# Patient Record
Sex: MALE | Race: WHITE | Employment: UNEMPLOYED | URBAN - METROPOLITAN AREA
[De-identification: names, ages, dates, MRNs, and addresses within clinical notes are randomized per-mention and may not be internally consistent; named-entity substitution may affect disease eponyms.]

---

## 2017-02-24 ENCOUNTER — OFFICE VISIT (OUTPATIENT)
Dept: FAMILY MEDICINE CLINIC | Age: 37
End: 2017-02-24

## 2017-02-24 VITALS
WEIGHT: 183 LBS | HEIGHT: 67 IN | RESPIRATION RATE: 18 BRPM | OXYGEN SATURATION: 98 % | TEMPERATURE: 98.6 F | SYSTOLIC BLOOD PRESSURE: 100 MMHG | DIASTOLIC BLOOD PRESSURE: 71 MMHG | HEART RATE: 68 BPM | BODY MASS INDEX: 28.72 KG/M2

## 2017-02-24 DIAGNOSIS — M54.5 ACUTE BILATERAL LOW BACK PAIN, WITH SCIATICA PRESENCE UNSPECIFIED: ICD-10-CM

## 2017-02-24 DIAGNOSIS — J01.00 ACUTE MAXILLARY SINUSITIS, RECURRENCE NOT SPECIFIED: Primary | ICD-10-CM

## 2017-02-24 DIAGNOSIS — J30.1 SEASONAL ALLERGIC RHINITIS DUE TO POLLEN: ICD-10-CM

## 2017-02-24 RX ORDER — FLUTICASONE PROPIONATE 50 MCG
2 SPRAY, SUSPENSION (ML) NASAL DAILY
Qty: 3 BOTTLE | Refills: 3 | Status: SHIPPED | OUTPATIENT
Start: 2017-02-24 | End: 2022-03-24 | Stop reason: SDUPTHER

## 2017-02-24 RX ORDER — CYCLOBENZAPRINE HCL 10 MG
10 TABLET ORAL
Qty: 45 TAB | Refills: 1 | Status: SHIPPED | OUTPATIENT
Start: 2017-02-24 | End: 2018-01-29 | Stop reason: SDUPTHER

## 2017-02-24 RX ORDER — DOXYCYCLINE 100 MG/1
100 CAPSULE ORAL 2 TIMES DAILY
Qty: 20 CAP | Refills: 0 | Status: SHIPPED | OUTPATIENT
Start: 2017-02-24 | End: 2021-10-21

## 2017-02-24 NOTE — MR AVS SNAPSHOT
Visit Information Date & Time Provider Department Dept. Phone Encounter #  
 2/24/2017  4:00 PM Bernabe Braswell, [address] [phone] [id number] Follow-up Instructions Return if symptoms worsen or fail to improve. Upcoming Health Maintenance Date Due DTaP/Tdap/Td series (1 - Tdap) 4/29/2000 INFLUENZA AGE 9 TO ADULT 8/1/2016 Allergies as of 2/24/2017  Review Complete On: 2/24/2017 By: Bernabe Braswell NP No Known Allergies Current Immunizations  Reviewed on 5/1/2013 Name Date  
 TB Skin Test (PPD) Intradermal 5/1/2013 Not reviewed this visit You Were Diagnosed With   
  
 Codes Comments Acute maxillary sinusitis, recurrence not specified    -  Primary ICD-10-CM: J01.00 ICD-9-CM: 461.0 Seasonal allergic rhinitis due to pollen     ICD-10-CM: J30.1 ICD-9-CM: 477.0 Acute bilateral low back pain, with sciatica presence unspecified     ICD-10-CM: M54.5 ICD-9-CM: 724.2 Vitals BP  
  
  
  
  
  
 100/71 BMI and BSA Data Body Mass Index Body Surface Area  
 28.66 kg/m 2 1.98 m 2 Preferred Pharmacy Pharmacy Name Phone [facility] [address] [place] [address] [phone] Your Updated Medication List  
  
   
This list is accurate as of: 2/24/17  4:52 PM.  Always use your most recent med list.  
  
  
  
  
 cyclobenzaprine 10 mg tablet Commonly known as:  FLEXERIL Take 1 Tab by mouth three (3) times daily as needed for Muscle Spasm(s). doxycycline 100 mg capsule Commonly known as:  Daved Pollen Take 1 Cap by mouth two (2) times a day. fluticasone 50 mcg/actuation nasal spray Commonly known as:  Cole Stockton 2 Sprays by Both Nostrils route daily for 90 days. Long term, three month supply  Indications: ALLERGIC RHINITIS Prescriptions Sent to Pharmacy  Refills  
 doxycycline (MONODOX) 100 mg capsule 0  
 Sig: Take 1 Cap by mouth two (2) times a day. Class: Normal  
 Pharmacy: [facility] [id number] [place] [address] Ph #: [phone] Route: Oral  
 fluticasone (FLONASE) 50 mcg/actuation nasal spray 3 Si Sprays by Both Nostrils route daily for 90 days. Long term, three month supply  Indications: ALLERGIC RHINITIS Class: Normal  
 Pharmacy: [facility] [id number] [place] [address] Ph #: [phone] Route: Both Nostrils  
 cyclobenzaprine (FLEXERIL) 10 mg tablet 1 Sig: Take 1 Tab by mouth three (3) times daily as needed for Muscle Spasm(s). Class: Normal  
 Pharmacy: [facility] [id number] [place] [address] Ph #: [phone] Route: Oral  
  
Follow-up Instructions Return if symptoms worsen or fail to improve. Patient Instructions Back Pain: Care Instructions Your Care Instructions Back pain has many possible causes. It is often related to problems with muscles and ligaments of the back. It may also be related to problems with the nerves, discs, or bones of the back. Moving, lifting, standing, sitting, or sleeping in an awkward way can strain the back. Sometimes you don't notice the injury until later. Arthritis is another common cause of back pain. Although it may hurt a lot, back pain usually improves on its own within several weeks. Most people recover in 12 weeks or less. Using good home treatment and being careful not to stress your back can help you feel better sooner. Follow-up care is a key part of your treatment and safety. Be sure to make and go to all appointments, and call your doctor if you are having problems. Its also a good idea to know your test results and keep a list of the medicines you take. How can you care for yourself at home? · Sit or lie in positions that are most comfortable and reduce your pain. Try one of these positions when you lie down: ¨ Lie on your back with your knees bent and supported by large pillows. ¨ Lie on the floor with your legs on the seat of a sofa or chair. Rhonda Able on your side with your knees and hips bent and a pillow between your legs. ¨ Lie on your stomach if it does not make pain worse. · Do not sit up in bed, and avoid soft couches and twisted positions. Bed rest can help relieve pain at first, but it delays healing. Avoid bed rest after the first day of back pain. · Change positions every 30 minutes. If you must sit for long periods of time, take breaks from sitting. Get up and walk around, or lie in a comfortable position. · Try using a heating pad on a low or medium setting for 15 to 20 minutes every 2 or 3 hours. Try a warm shower in place of one session with the heating pad. · You can also try an ice pack for 10 to 15 minutes every 2 to 3 hours. Put a thin cloth between the ice pack and your skin. · Take pain medicines exactly as directed. ¨ If the doctor gave you a prescription medicine for pain, take it as prescribed. ¨ If you are not taking a prescription pain medicine, ask your doctor if you can take an over-the-counter medicine. · Take short walks several times a day. You can start with 5 to 10 minutes, 3 or 4 times a day, and work up to longer walks. Walk on level surfaces and avoid hills and stairs until your back is better. · Return to work and other activities as soon as you can. Continued rest without activity is usually not good for your back. · To prevent future back pain, do exercises to stretch and strengthen your back and stomach. Learn how to use good posture, safe lifting techniques, and proper body mechanics. When should you call for help? Call your doctor now or seek immediate medical care if: 
· You have new or worsening numbness in your legs. · You have new or worsening weakness in your legs. (This could make it hard to stand up.) · You lose control of your bladder or bowels. Watch closely for changes in your health, and be sure to contact your doctor if: 
· Your pain gets worse. · You are not getting better after 2 weeks. Where can you learn more? Go to http://alicia-cam.info/. Enter R196 in the search box to learn more about \"Back Pain: Care Instructions. \" Current as of: May 23, 2016 Content Version: 11.1 © 6484-5281 Bloom Studio. Care instructions adapted under license by TheFamily (which disclaims liability or warranty for this information). If you have questions about a medical condition or this instruction, always ask your healthcare professional. [place] [address] any warranty or liability for your use of this information. Back Stretches: Exercises Your Care Instructions Here are some examples of exercises for stretching your back. Start each exercise slowly. Ease off the exercise if you start to have pain. Your doctor or physical therapist will tell you when you can start these exercises and which ones will work best for you. How to do the exercises Overhead stretch 1. Stand comfortably with your feet shoulder-width apart. 2. Looking straight ahead, raise both arms over your head and reach toward the ceiling. Do not allow your head to tilt back. 3. Hold for 15 to 30 seconds, then lower your arms to your sides. 4. Repeat 2 to 4 times. Side stretch 1. Stand comfortably with your feet shoulder-width apart. 2. Raise one arm over your head, and then lean to the other side. 3. Slide your hand down your leg as you let the weight of your arm gently stretch your side muscles. Hold for 15 to 30 seconds. 4. Repeat 2 to 4 times on each side. Press-up 1. Lie on your stomach, supporting your body with your forearms. 2. Press your elbows down into the floor to raise your upper back.  As you do this, relax your stomach muscles and allow your back to arch without using your back muscles. As your press up, do not let your hips or pelvis come off the floor. 3. Hold for 15 to 30 seconds, then relax. 4. Repeat 2 to 4 times. Relax and rest 
 
1. Lie on your back with a rolled towel under your neck and a pillow under your knees. Extend your arms comfortably to your sides. 2. Relax and breathe normally. 3. Remain in this position for about 10 minutes. 4. If you can, do this 2 or 3 times each day. Follow-up care is a key part of your treatment and safety. Be sure to make and go to all appointments, and call your doctor if you are having problems. It's also a good idea to know your test results and keep a list of the medicines you take. Where can you learn more? Go to http://alicia-cam.info/. Enter T372 in the search box to learn more about \"Back Stretches: Exercises. \" Current as of: May 23, 2016 Content Version: 11.1 © 1385-0439 Luvocracy. Care instructions adapted under license by TeeBeeDee (which disclaims liability or warranty for this information). If you have questions about a medical condition or this instruction, always ask your healthcare professional. [place] [address] any warranty or liability for your use of this information. Introducing [facility]! Shey Anderson introduces Zecco patient portal. Now you can access parts of your medical record, email your doctor's office, and request medication refills online. 1. In your internet browser, go to [email or web address] 2. Click on the First Time User? Click Here link in the Sign In box. You will see the New Member Sign Up page. 3. Enter your Zecco Access Code exactly as it appears below. You will not need to use this code after youve completed the sign-up process. If you do not sign up before the expiration date, you must request a new code. · Zecco Access Code: [email or web address] Expires: 5/25/2017  4:52 PM 
 
4. Enter the last four digits of your Social Security Number (xxxx) and Date of Birth (mm/dd/yyyy) as indicated and click Submit. You will be taken to the next sign-up page. 5. Create a Outitude ID. This will be your Outitude login ID and cannot be changed, so think of one that is secure and easy to remember. 6. Create a Outitude password. You can change your password at any time. 7. Enter your Password Reset Question and Answer. This can be used at a later time if you forget your password. 8. Enter your e-mail address. You will receive e-mail notification when new information is available in [address] 9. Click Sign Up. You can now view and download portions of your medical record. 10. Click the Download Summary menu link to download a portable copy of your medical information. If you have questions, please visit the Frequently Asked Questions section of the Outitude website. Remember, Outitude is NOT to be used for urgent needs. For medical emergencies, dial 911. Now available from your iPhone and Android! Please provide this summary of care documentation to your next provider. Your primary care clinician is listed as HONEY RIBERA. If you have any questions after today's visit, please call [phone].

## 2017-02-24 NOTE — LETTER
NOTIFICATION RETURN TO WORK / SCHOOL 
 
2/24/2017 4:53 PM 
 
Mr. Rody High [address] [address] To Whom It May Concern: Rody High is currently under the care of [address] [place] [address]. He will return to work/school on: 2-27-17 If there are questions or concerns please have the patient contact our office.  
 
 
 
Sincerely, 
 
 
Igor Carlos, NP

## 2017-02-24 NOTE — PATIENT INSTRUCTIONS
Back Pain: Care Instructions  Your Care Instructions    Back pain has many possible causes. It is often related to problems with muscles and ligaments of the back. It may also be related to problems with the nerves, discs, or bones of the back. Moving, lifting, standing, sitting, or sleeping in an awkward way can strain the back. Sometimes you don't notice the injury until later. Arthritis is another common cause of back pain. Although it may hurt a lot, back pain usually improves on its own within several weeks. Most people recover in 12 weeks or less. Using good home treatment and being careful not to stress your back can help you feel better sooner. Follow-up care is a key part of your treatment and safety. Be sure to make and go to all appointments, and call your doctor if you are having problems. Its also a good idea to know your test results and keep a list of the medicines you take. How can you care for yourself at home? · Sit or lie in positions that are most comfortable and reduce your pain. Try one of these positions when you lie down:  ¨ Lie on your back with your knees bent and supported by large pillows. ¨ Lie on the floor with your legs on the seat of a sofa or chair. Judie Gal on your side with your knees and hips bent and a pillow between your legs. ¨ Lie on your stomach if it does not make pain worse. · Do not sit up in bed, and avoid soft couches and twisted positions. Bed rest can help relieve pain at first, but it delays healing. Avoid bed rest after the first day of back pain. · Change positions every 30 minutes. If you must sit for long periods of time, take breaks from sitting. Get up and walk around, or lie in a comfortable position. · Try using a heating pad on a low or medium setting for 15 to 20 minutes every 2 or 3 hours. Try a warm shower in place of one session with the heating pad. · You can also try an ice pack for 10 to 15 minutes every 2 to 3 hours.  Put a thin cloth between the ice pack and your skin. · Take pain medicines exactly as directed. ¨ If the doctor gave you a prescription medicine for pain, take it as prescribed. ¨ If you are not taking a prescription pain medicine, ask your doctor if you can take an over-the-counter medicine. · Take short walks several times a day. You can start with 5 to 10 minutes, 3 or 4 times a day, and work up to longer walks. Walk on level surfaces and avoid hills and stairs until your back is better. · Return to work and other activities as soon as you can. Continued rest without activity is usually not good for your back. · To prevent future back pain, do exercises to stretch and strengthen your back and stomach. Learn how to use good posture, safe lifting techniques, and proper body mechanics. When should you call for help? Call your doctor now or seek immediate medical care if:  · You have new or worsening numbness in your legs. · You have new or worsening weakness in your legs. (This could make it hard to stand up.)  · You lose control of your bladder or bowels. Watch closely for changes in your health, and be sure to contact your doctor if:  · Your pain gets worse. · You are not getting better after 2 weeks. Where can you learn more? Go to http://alicia-cam.info/. Enter A010 in the search box to learn more about \"Back Pain: Care Instructions. \"  Current as of: May 23, 2016  Content Version: 11.1  © 2601-4738 Charity Engine. Care instructions adapted under license by Vello Systems (which disclaims liability or warranty for this information). If you have questions about a medical condition or this instruction, always ask your healthcare professional. [address] any warranty or liability for your use of this information. Back Stretches: Exercises  Your Care Instructions  Here are some examples of exercises for stretching your back. Start each exercise slowly. Ease off the exercise if you start to have pain. Your doctor or physical therapist will tell you when you can start these exercises and which ones will work best for you. How to do the exercises  Overhead stretch    1. Stand comfortably with your feet shoulder-width apart. 2. Looking straight ahead, raise both arms over your head and reach toward the ceiling. Do not allow your head to tilt back. 3. Hold for 15 to 30 seconds, then lower your arms to your sides. 4. Repeat 2 to 4 times. Side stretch    1. Stand comfortably with your feet shoulder-width apart. 2. Raise one arm over your head, and then lean to the other side. 3. Slide your hand down your leg as you let the weight of your arm gently stretch your side muscles. Hold for 15 to 30 seconds. 4. Repeat 2 to 4 times on each side. Press-up    1. Lie on your stomach, supporting your body with your forearms. 2. Press your elbows down into the floor to raise your upper back. As you do this, relax your stomach muscles and allow your back to arch without using your back muscles. As your press up, do not let your hips or pelvis come off the floor. 3. Hold for 15 to 30 seconds, then relax. 4. Repeat 2 to 4 times. Relax and rest    1. Lie on your back with a rolled towel under your neck and a pillow under your knees. Extend your arms comfortably to your sides. 2. Relax and breathe normally. 3. Remain in this position for about 10 minutes. 4. If you can, do this 2 or 3 times each day. Follow-up care is a key part of your treatment and safety. Be sure to make and go to all appointments, and call your doctor if you are having problems. It's also a good idea to know your test results and keep a list of the medicines you take. Where can you learn more? Go to http://alicia-cam.info/. Enter N929 in the search box to learn more about \"Back Stretches: Exercises. \"  Current as of: May 23, 2016  Content Version: 11.1  © 4102-0786 Healthwise, Incorporated. Care instructions adapted under license by Spreedly (which disclaims liability or warranty for this information). If you have questions about a medical condition or this instruction, always ask your healthcare professional. [address] any warranty or liability for your use of this information.

## 2017-02-24 NOTE — PROGRESS NOTES
Chief Complaint   Patient presents with    Nasal Congestion     for 1 week, has tried otc cold and flu meds

## 2017-02-24 NOTE — PROGRESS NOTES
Subjective:   Herman Frankel is a 39 y.o. male who complains of congestion, sneezing, sore throat, swollen glands, nasal blockage, post nasal drip, dry cough, myalgias and bilateral sinus pain for 7 days, gradually improving since that time. He denies a history of shortness of breath and wheezing. He has also had low back pain off and on that comes and goes. No radiation down the legs. .    Evaluation to date: none. Treatment to date: OTC products. Patient does not smoke cigarettes. Relevant PMH: No pertinent additional PMH. Patient Active Problem List   Diagnosis Code    ADHD (attention deficit hyperactivity disorder) F90.9    Depression F32.9     Patient Active Problem List    Diagnosis Date Noted    ADHD (attention deficit hyperactivity disorder) 05/01/2013    Depression 05/01/2013       No Known Allergies  Past Medical History:   Diagnosis Date    ADHD (attention deficit hyperactivity disorder) 5/1/2013    Depression 5/1/2013     History reviewed. No pertinent surgical history. History reviewed. No pertinent family history. Social History   Substance Use Topics    Smoking status: Passive Smoke Exposure - Never Smoker    Smokeless tobacco: Never Used    Alcohol use Yes        Review of Systems  Pertinent items are noted in HPI. Objective:     Visit Vitals    /71    Pulse 68    Temp 98.6 °F (37 °C) (Oral)    Resp 18    Ht 5' 7\" (1.702 m)    Wt 183 lb (83 kg)    SpO2 98%    BMI 28.66 kg/m2     General:  alert, cooperative, no distress   Eyes: negative   Ears: abnormal TM AD - erythematous, bulging, abnormal TM AS - erythematous, bulging   Sinuses: tenderness over bilateral maxillary, frontal and ethmoid   Mouth:  Lips, mucosa, and tongue normal. Teeth and gums normal and abnormal findings: moderate oropharyngeal erythema   Neck: supple, symmetrical, trachea midline,,mild anterior cervical adenopathy. Heart: S1 and S2 normal, no murmurs noted.     Lungs: clear to auscultation bilaterally   Abdomen: soft, non-tender. Bowel sounds normal. No masses,  no organomegaly        Assessment/Plan:   Sinusitis  Allergic rhinitis  Back pain    Discussed the dx and tx of sinusitis. Suggested symptomatic OTC remedies. Nasal saline sprays for congestion. Antibiotics per orders. RTC prn. [place]

## 2018-01-29 ENCOUNTER — OFFICE VISIT (OUTPATIENT)
Dept: FAMILY MEDICINE CLINIC | Age: 38
End: 2018-01-29

## 2018-01-29 VITALS
TEMPERATURE: 96.1 F | OXYGEN SATURATION: 99 % | RESPIRATION RATE: 16 BRPM | HEIGHT: 67 IN | BODY MASS INDEX: 29.29 KG/M2 | DIASTOLIC BLOOD PRESSURE: 73 MMHG | WEIGHT: 186.6 LBS | SYSTOLIC BLOOD PRESSURE: 116 MMHG | HEART RATE: 71 BPM

## 2018-01-29 DIAGNOSIS — S39.012A STRAIN OF LUMBAR REGION, INITIAL ENCOUNTER: ICD-10-CM

## 2018-01-29 RX ORDER — CYCLOBENZAPRINE HCL 10 MG
10 TABLET ORAL
Qty: 45 TAB | Refills: 1 | Status: SHIPPED | OUTPATIENT
Start: 2018-01-29 | End: 2021-10-21

## 2018-01-29 NOTE — PROGRESS NOTES
HISTORY OF PRESENT ILLNESS  Noreen De La Cruz is a 44 y.o. male. HPI   This gentleman has had intermittent low back pain. He is here for refills on his flexeril which he states has been helpful to him in the past.  He apparently aggravated his back playing with his son over the weekend. Pain is low back, no radiation  no problems with urination or bowel dysfunction    Review of Systems   Constitutional: Negative for fever. Gastrointestinal: Negative for abdominal pain. Genitourinary: Negative for dysuria, frequency and urgency. Musculoskeletal: Positive for back pain. Negative for joint pain and myalgias. Physical Exam   Constitutional: He is oriented to person, place, and time. He appears well-developed and well-nourished. No distress. Musculoskeletal: Normal range of motion. He exhibits tenderness. Mild paraspinal muscle ttp lumbo scaral region,slt negative   Neurological: He is alert and oriented to person, place, and time. He has normal reflexes. He displays normal reflexes. No cranial nerve deficit. He exhibits normal muscle tone. Coordination normal.   Skin: He is not diaphoretic.        ASSESSMENT and PLAN    ICD-10-CM ICD-9-CM    1. Strain of lumbar region, initial encounter S39.012A 847.2 cyclobenzaprine (FLEXERIL) 10 mg tablet   motrin prn  Flexeril refilled  Precautions given  Follow up if not better

## 2018-01-29 NOTE — PROGRESS NOTES
Chief Complaint   Patient presents with    Back Pain     Woke up during the night with pain in upper mid-back. Now back again. Out of Flexeril.  History of back strains

## 2020-12-19 ENCOUNTER — NURSE TRIAGE (OUTPATIENT)
Dept: OTHER | Facility: CLINIC | Age: 40
End: 2020-12-19

## 2020-12-19 NOTE — TELEPHONE ENCOUNTER
Reason for Disposition   [1] SEVERE back pain (e.g., excruciating, unable to do any normal activities) AND [2] not improved 2 hours after pain medicine    Answer Assessment - Initial Assessment Questions  1. MECHANISM: \"How did the injury happen? \" (Consider the possibility of domestic violence or elder abuse)      Bent over and pulled his back out    2. ONSET: \"When did the injury happen? \" (Minutes or hours ago)  Occurred today    3. LOCATION: \"What part of the back is injured? \"      Lower back    4. SEVERITY: \"Can you move the back normally?\"        5. PAIN: \"Is there any pain? \" If so, ask: \"How bad is the pain? \"   (Scale 1-10; or mild, moderate, severe)      Pain level laying down is 5/10, moving pain will go to 7-8/10    6. CORD SYMPTOMS: Any weakness or numbness of the arms or legs? \"      No    7. SIZE: For cuts, bruises, or swelling, ask: \"How large is it? \" (e.g., inches or centimeters)        8. TETANUS: For any breaks in the skin, ask: \"When was the last tetanus booster?\"       9. OTHER SYMPTOMS: \"Do you have any other symptoms? \" (e.g., abdominal pain, blood in urine)      No other symptoms    10. PREGNANCY: \"Is there any chance you are pregnant? \" \"When was your last menstrual period? \"       Na    Protocols used: BACK PAIN-ADULT-, BACK INJURY-ADULT-    Patient called pre-service center [facility]) to schedule appointment, with red flag complaint, transferred to RN access for triage. Pt calling wife. Pt in room. Wife states pt bent over today and pulled his back out. Unable to get up off the floor. Pain when movign is a 7-8/10. No other symptoms. Has taken Naproxen. Recommend pt is seen, can go to [facility], care advise given. Provided covered [facility] for MMO plan.

## 2021-07-07 ENCOUNTER — OFFICE VISIT (OUTPATIENT)
Dept: FAMILY MEDICINE CLINIC | Age: 41
End: 2021-07-07
Payer: COMMERCIAL

## 2021-07-07 VITALS
OXYGEN SATURATION: 96 % | TEMPERATURE: 98.4 F | HEART RATE: 59 BPM | BODY MASS INDEX: 29.51 KG/M2 | HEIGHT: 67 IN | RESPIRATION RATE: 18 BRPM | SYSTOLIC BLOOD PRESSURE: 117 MMHG | WEIGHT: 188 LBS | DIASTOLIC BLOOD PRESSURE: 74 MMHG

## 2021-07-07 DIAGNOSIS — F41.9 ANXIETY: Primary | ICD-10-CM

## 2021-07-07 PROCEDURE — 99203 OFFICE O/P NEW LOW 30 MIN: CPT | Performed by: FAMILY MEDICINE

## 2021-07-07 RX ORDER — SERTRALINE HYDROCHLORIDE 25 MG/1
25 TABLET, FILM COATED ORAL DAILY
Qty: 30 TABLET | Refills: 2 | Status: SHIPPED | OUTPATIENT
Start: 2021-07-07 | End: 2021-08-13 | Stop reason: SDUPTHER

## 2021-07-07 NOTE — PATIENT INSTRUCTIONS
A Healthy Lifestyle: Care Instructions  Your Care Instructions     A healthy lifestyle can help you feel good, stay at a healthy weight, and have plenty of energy for both work and play. A healthy lifestyle is something you can share with your whole family. A healthy lifestyle also can lower your risk for serious health problems, such as high blood pressure, heart disease, and diabetes. You can follow a few steps listed below to improve your health and the health of your family. Follow-up care is a key part of your treatment and safety. Be sure to make and go to all appointments, and call your doctor if you are having problems. It's also a good idea to know your test results and keep a list of the medicines you take. How can you care for yourself at home? · Do not eat too much sugar, fat, or fast foods. You can still have dessert and treats now and then. The goal is moderation. · Start small to improve your eating habits. Pay attention to portion sizes, drink less juice and soda pop, and eat more fruits and vegetables. ? Eat a healthy amount of food. A 3-ounce serving of meat, for example, is about the size of a deck of cards. Fill the rest of your plate with vegetables and whole grains. ? Limit the amount of soda and sports drinks you have every day. Drink more water when you are thirsty. ? Eat plenty of fruits and vegetables every day. Have an apple or some carrot sticks as an afternoon snack instead of a candy bar. Try to have fruits and/or vegetables at every meal.  · Make exercise part of your daily routine. You may want to start with simple activities, such as walking, bicycling, or slow swimming. Try to be active 30 to 60 minutes every day. You do not need to do all 30 to 60 minutes all at once. For example, you can exercise 3 times a day for 10 or 20 minutes.  Moderate exercise is safe for most people, but it is always a good idea to talk to your doctor before starting an exercise program.  · Keep moving. [place] the lawn, work in the garden, or [employer]. Take the stairs instead of the elevator at work. · If you smoke, quit. People who smoke have an increased risk for heart attack, stroke, cancer, and other lung illnesses. Quitting is hard, but there are ways to boost your chance of quitting tobacco for good. ? Use nicotine gum, patches, or lozenges. ? Ask your doctor about stop-smoking programs and medicines. ? Keep trying. In addition to reducing your risk of diseases in the future, you will notice some benefits soon after you stop using tobacco. If you have shortness of breath or asthma symptoms, they will likely get better within a few weeks after you quit. · Limit how much alcohol you drink. Moderate amounts of alcohol (up to 2 drinks a day for men, 1 drink a day for women) are okay. But drinking too much can lead to liver problems, high blood pressure, and other health problems. Family health  If you have a family, there are many things you can do together to improve your health. · Eat meals together as a family as often as possible. · Eat healthy foods. This includes fruits, vegetables, lean meats and dairy, and whole grains. · Include your family in your fitness plan. Most people think of activities such as jogging or tennis as the way to fitness, but there are many ways you and your family can be more active. Anything that makes you breathe hard and gets your heart pumping is exercise. Here are some tips:  ? Walk to do errands or to take your child to school or the bus.  ? Go for a family bike ride after dinner instead of watching TV. Where can you learn more? Go to http://www.gray.com/  Enter V710 in the search box to learn more about \"A Healthy Lifestyle: Care Instructions. \"  Current as of: September 23, 2020               Content Version: 12.8  © 4807-3539 Healthwise, [employer].    Care instructions adapted under license by [employer] (which disclaims liability or warranty for this information). If you have questions about a medical condition or this instruction, always ask your healthcare professional. [address] any warranty or liability for your use of this information.

## 2021-07-07 NOTE — PROGRESS NOTES
Chief Complaint   Patient presents with    New Patient   [address]     Patient presents in office today as a NP to re-establish care. Would like to see about going back on medication for his ADHD and depression. No other concerns.     3 most recent PHQ Screens 7/7/2021   Little interest or pleasure in doing things Several days   Feeling down, depressed, irritable, or hopeless More than half the days   Total Score PHQ 2 3   Trouble falling or staying asleep, or sleeping too much Not at all   Feeling tired or having little energy Nearly every day   Poor appetite, weight loss, or overeating Not at all   Feeling bad about yourself - or that you are a failure or have let yourself or your family down Several days   Trouble concentrating on things such as school, work, reading, or watching TV Several days   Moving or speaking so slowly that other people could have noticed; or the opposite being so fidgety that others notice Not at all   Thoughts of being better off dead, or hurting yourself in some way Not at all   PHQ 9 Score 8   How difficult have these problems made it for you to do your work, take care of your home and get along with others Somewhat difficult         Learning Assessment 12/12/2013   PRIMARY LEARNER Patient   HIGHEST LEVEL OF EDUCATION - PRIMARY LEARNER  SOME COLLEGE   BARRIERS PRIMARY LEARNER OTHER   CO-LEARNER CAREGIVER No   PRIMARY LANGUAGE ENGLISH   LEARNER PREFERENCE PRIMARY DEMONSTRATION   ANSWERED BY patient   RELATIONSHIP SELF

## 2021-07-07 NOTE — PROGRESS NOTES
Progress Note    he is a 42y.o. year old male who presents for evalution. Subjective:     Pt to re establish care and was prev on Vyvanse for ADD. Has anxiety and this is what is bothering him the worst.  Was on wellbutrin for depression and this helped. Wife is here with him reports that he has seemed significantly more anxious and this seems to be his underlying issue with his focus/attention as well currently. He has never taken an SSRI before. He would like a complete physical exam discussed he could schedule this for his follow-up. Reviewed PmHx, RxHx, FmHx, SocHx, AllgHx and updated and dated in the chart. Review of Systems - negative except as listed above in the HPI    Objective:     Vitals:    07/07/21 1045   BP: 117/74   Pulse: (!) 59   Resp: 18   Temp: 98.4 °F (36.9 °C)   TempSrc: Oral   SpO2: 96%   Weight: 188 lb (85.3 kg)   Height: 5' 7\" (1.702 m)       Current Outpatient Medications   Medication Sig    sertraline (ZOLOFT) 25 mg tablet Take 1 Tablet by mouth daily.  cyclobenzaprine (FLEXERIL) 10 mg tablet Take 1 Tab by mouth three (3) times daily as needed for Muscle Spasm(s). (Patient not taking: Reported on 7/7/2021)    doxycycline (MONODOX) 100 mg capsule Take 1 Cap by mouth two (2) times a day. (Patient not taking: Reported on 7/7/2021)    fluticasone (FLONASE) 50 mcg/actuation nasal spray 2 Sprays by Both Nostrils route daily for 90 days. Long term, three month supply  Indications: ALLERGIC RHINITIS     No current facility-administered medications for this visit. Physical Examination: General appearance - alert, well appearing, and in no distress  Mental status - alert, oriented to person, place, and time  Chest - clear to auscultation, no wheezes, rales or rhonchi, symmetric air entry  Heart - normal rate, regular rhythm, normal S1, S2, no murmurs, rubs, clicks or gallops      Assessment/ Plan:   Diagnoses and all orders for this visit:    1.  Anxiety  - sertraline (ZOLOFT) 25 mg tablet; Take 1 Tablet by mouth daily. Follow-up and Dispositions    · Return in about 4 weeks (around 8/4/2021), or if symptoms worsen or fail to improve. I have discussed the diagnosis with the patient and the intended plan as seen in the above orders. The patient has received an after-visit summary and questions were answered concerning future plans. Pt conveyed understanding of plan.     Medication Side Effects and Warnings were discussed with patient    An electronic signature was used to authenticate this note  Yazmin Pompa, DO

## 2021-08-13 ENCOUNTER — OFFICE VISIT (OUTPATIENT)
Dept: FAMILY MEDICINE CLINIC | Age: 41
End: 2021-08-13
Payer: COMMERCIAL

## 2021-08-13 VITALS
WEIGHT: 187 LBS | DIASTOLIC BLOOD PRESSURE: 74 MMHG | OXYGEN SATURATION: 96 % | TEMPERATURE: 97.9 F | RESPIRATION RATE: 18 BRPM | HEART RATE: 69 BPM | HEIGHT: 67 IN | SYSTOLIC BLOOD PRESSURE: 112 MMHG | BODY MASS INDEX: 29.35 KG/M2

## 2021-08-13 DIAGNOSIS — Z00.00 PHYSICAL EXAM: Primary | ICD-10-CM

## 2021-08-13 DIAGNOSIS — R68.82 LOW LIBIDO: ICD-10-CM

## 2021-08-13 DIAGNOSIS — B35.3 TINEA PEDIS OF BOTH FEET: ICD-10-CM

## 2021-08-13 DIAGNOSIS — F41.9 ANXIETY: ICD-10-CM

## 2021-08-13 PROCEDURE — 99396 PREV VISIT EST AGE 40-64: CPT | Performed by: FAMILY MEDICINE

## 2021-08-13 RX ORDER — SERTRALINE HYDROCHLORIDE 25 MG/1
25 TABLET, FILM COATED ORAL DAILY
Qty: 90 TABLET | Refills: 1 | Status: SHIPPED | OUTPATIENT
Start: 2021-08-13 | End: 2021-09-17

## 2021-08-13 RX ORDER — NYSTATIN 100000 [USP'U]/G
POWDER TOPICAL 3 TIMES DAILY
Qty: 1 BOTTLE | Refills: 2 | Status: SHIPPED | OUTPATIENT
Start: 2021-08-13 | End: 2021-10-21

## 2021-08-13 NOTE — PROGRESS NOTES
Chief Complaint   Patient presents with    Complete Physical    Labs     he is a 42y.o. year old male who presents for evalution. Pt here for CPE and is on zoloft 25 for anxiety. Had some nausea in the beginning but resolved. Would like to continue the 25 daily. Would like T checked lower energy lower libido. Reviewed PmHx, RxHx, FmHx, SocHx, AllgHx and updated and dated in the chart. Review of Systems - negative except as listed above in the HPI    Objective:     Vitals:    08/13/21 0809   BP: 112/74   Pulse: 69   Resp: 18   Temp: 97.9 °F (36.6 °C)   TempSrc: Oral   SpO2: 96%   Weight: 187 lb (84.8 kg)   Height: 5' 7\" (1.702 m)     Physical Examination: General appearance - alert, well appearing, and in no distress  Mental status - alert, oriented to person, place, and time  Eyes - pupils equal and reactive, extraocular eye movements intact  Ears - bilateral TM's and external ear canals normal  Neck - supple, no significant adenopathy  Lymphatics - no palpable lymphadenopathy, no hepatosplenomegaly  Chest - clear to auscultation, no wheezes, rales or rhonchi, symmetric air entry  Heart - normal rate, regular rhythm, normal S1, S2, no murmurs, rubs, clicks or gallops  Abdomen - soft, nontender, nondistended, no masses or organomegaly  Neurological - alert, oriented, normal speech, no focal findings or movement disorder noted  Musculoskeletal - no joint tenderness, deformity or swelling  Extremities - peripheral pulses normal, no pedal edema, no clubbing or cyanosis, stas pedis b/l    Assessment/ Plan:   Diagnoses and all orders for this visit:    1. Physical exam  -     LIPID PANEL; Future  -     METABOLIC PANEL, COMPREHENSIVE; Future  -     CBC WITH AUTOMATED DIFF; Future  -     TSH 3RD GENERATION; Future  -     HEMOGLOBIN A1C WITH EAG; Future  -     TESTOSTERONE, FREE & TOTAL; Future    2. Anxiety  -     sertraline (ZOLOFT) 25 mg tablet; Take 1 Tablet by mouth daily.     3. Low libido  - TESTOSTERONE, FREE & TOTAL; Future    4. Tinea pedis of both feet  -     nystatin (MYCOSTATIN) powder; Apply  to affected area three (3) times daily.       -Patient is in good health  -Discussed with patient cancer risk factors and screens needed  -Patient needs a colonoscopy no  -Labs from previous visits were discussed with patient yes  -Discussed with patient diet and exercise=yes  -Discussed with patient testicular (male)/breast self exam (female)= yes  Follow-up and Dispositions    · Return in about 3 months (around 11/13/2021), or if symptoms worsen or fail to improve, for Zoloft evaluation. I have discussed the diagnosis with the patient and the intended plan as seen in the above orders. The patient has received an after-visit summary and questions were answered concerning future plans. Pt conveyed understanding. Medication Side Effects and Warnings were discussed with patient: yes  Patient Labs were reviewed and or requested: yes  Patient Past Records were reviewed and or requested  yes    Patient Instructions        A Healthy Lifestyle: Care Instructions  Your Care Instructions     A healthy lifestyle can help you feel good, stay at a healthy weight, and have plenty of energy for both work and play. A healthy lifestyle is something you can share with your whole family. A healthy lifestyle also can lower your risk for serious health problems, such as high blood pressure, heart disease, and diabetes. You can follow a few steps listed below to improve your health and the health of your family. Follow-up care is a key part of your treatment and safety. Be sure to make and go to all appointments, and call your doctor if you are having problems. It's also a good idea to know your test results and keep a list of the medicines you take. How can you care for yourself at home? · Do not eat too much sugar, fat, or fast foods. You can still have dessert and treats now and then.  The goal is moderation. · Start small to improve your eating habits. Pay attention to portion sizes, drink less juice and soda pop, and eat more fruits and vegetables. ? Eat a healthy amount of food. A 3-ounce serving of meat, for example, is about the size of a deck of cards. Fill the rest of your plate with vegetables and whole grains. ? Limit the amount of soda and sports drinks you have every day. Drink more water when you are thirsty. ? Eat plenty of fruits and vegetables every day. Have an apple or some carrot sticks as an afternoon snack instead of a candy bar. Try to have fruits and/or vegetables at every meal.  · Make exercise part of your daily routine. You may want to start with simple activities, such as walking, bicycling, or slow swimming. Try to be active 30 to 60 minutes every day. You do not need to do all 30 to 60 minutes all at once. For example, you can exercise 3 times a day for 10 or 20 minutes. Moderate exercise is safe for most people, but it is always a good idea to talk to your doctor before starting an exercise program.  · Keep moving. [place] the lawn, work in the garden, or [employer]. Take the stairs instead of the elevator at work. · If you smoke, quit. People who smoke have an increased risk for heart attack, stroke, cancer, and other lung illnesses. Quitting is hard, but there are ways to boost your chance of quitting tobacco for good. ? Use nicotine gum, patches, or lozenges. ? Ask your doctor about stop-smoking programs and medicines. ? Keep trying. In addition to reducing your risk of diseases in the future, you will notice some benefits soon after you stop using tobacco. If you have shortness of breath or asthma symptoms, they will likely get better within a few weeks after you quit. · Limit how much alcohol you drink. Moderate amounts of alcohol (up to 2 drinks a day for men, 1 drink a day for women) are okay.  But drinking too much can lead to liver problems, high blood pressure, and other health problems. Family health  If you have a family, there are many things you can do together to improve your health. · Eat meals together as a family as often as possible. · Eat healthy foods. This includes fruits, vegetables, lean meats and dairy, and whole grains. · Include your family in your fitness plan. Most people think of activities such as jogging or tennis as the way to fitness, but there are many ways you and your family can be more active. Anything that makes you breathe hard and gets your heart pumping is exercise. Here are some tips:  ? Walk to do errands or to take your child to school or the bus.  ? Go for a family bike ride after dinner instead of watching TV. Where can you learn more? Go to http://www.gray.com/  Enter J728 in the search box to learn more about \"A Healthy Lifestyle: Care Instructions. \"  Current as of: September 23, 2020               Content Version: 12.8  © 2006-2021 Healthwise, [employer]. Care instructions adapted under license by [employer] (which disclaims liability or warranty for this information). If you have questions about a medical condition or this instruction, always ask your healthcare professional. [address] any warranty or liability for your use of this information.                 Dr. Mellisa Zuñiga

## 2021-08-13 NOTE — PROGRESS NOTES
Chief Complaint   Patient presents with    Complete Physical    Labs     Patient presents in office today for CPE and fasting labs. States that the sertraline is helping with his mood   No concerns. 1. Have you been to the ER, urgent care clinic since your last visit? Hospitalized since your last visit? No    2. Have you seen or consulted any other health care providers outside of the [address] [place] [address] since your last visit? Include any pap smears or colon screening.  No    Learning Assessment 12/12/2013   PRIMARY LEARNER Patient   HIGHEST LEVEL OF EDUCATION - PRIMARY LEARNER  SOME COLLEGE   BARRIERS PRIMARY LEARNER OTHER   CO-LEARNER CAREGIVER No   PRIMARY LANGUAGE ENGLISH   LEARNER PREFERENCE PRIMARY DEMONSTRATION   ANSWERED BY patient   RELATIONSHIP SELF

## 2021-08-13 NOTE — PATIENT INSTRUCTIONS
A Healthy Lifestyle: Care Instructions  Your Care Instructions     A healthy lifestyle can help you feel good, stay at a healthy weight, and have plenty of energy for both work and play. A healthy lifestyle is something you can share with your whole family. A healthy lifestyle also can lower your risk for serious health problems, such as high blood pressure, heart disease, and diabetes. You can follow a few steps listed below to improve your health and the health of your family. Follow-up care is a key part of your treatment and safety. Be sure to make and go to all appointments, and call your doctor if you are having problems. It's also a good idea to know your test results and keep a list of the medicines you take. How can you care for yourself at home? · Do not eat too much sugar, fat, or fast foods. You can still have dessert and treats now and then. The goal is moderation. · Start small to improve your eating habits. Pay attention to portion sizes, drink less juice and soda pop, and eat more fruits and vegetables. ? Eat a healthy amount of food. A 3-ounce serving of meat, for example, is about the size of a deck of cards. Fill the rest of your plate with vegetables and whole grains. ? Limit the amount of soda and sports drinks you have every day. Drink more water when you are thirsty. ? Eat plenty of fruits and vegetables every day. Have an apple or some carrot sticks as an afternoon snack instead of a candy bar. Try to have fruits and/or vegetables at every meal.  · Make exercise part of your daily routine. You may want to start with simple activities, such as walking, bicycling, or slow swimming. Try to be active 30 to 60 minutes every day. You do not need to do all 30 to 60 minutes all at once. For example, you can exercise 3 times a day for 10 or 20 minutes.  Moderate exercise is safe for most people, but it is always a good idea to talk to your doctor before starting an exercise program.  · Keep moving. [place] the lawn, work in the garden, or [employer]. Take the stairs instead of the elevator at work. · If you smoke, quit. People who smoke have an increased risk for heart attack, stroke, cancer, and other lung illnesses. Quitting is hard, but there are ways to boost your chance of quitting tobacco for good. ? Use nicotine gum, patches, or lozenges. ? Ask your doctor about stop-smoking programs and medicines. ? Keep trying. In addition to reducing your risk of diseases in the future, you will notice some benefits soon after you stop using tobacco. If you have shortness of breath or asthma symptoms, they will likely get better within a few weeks after you quit. · Limit how much alcohol you drink. Moderate amounts of alcohol (up to 2 drinks a day for men, 1 drink a day for women) are okay. But drinking too much can lead to liver problems, high blood pressure, and other health problems. Family health  If you have a family, there are many things you can do together to improve your health. · Eat meals together as a family as often as possible. · Eat healthy foods. This includes fruits, vegetables, lean meats and dairy, and whole grains. · Include your family in your fitness plan. Most people think of activities such as jogging or tennis as the way to fitness, but there are many ways you and your family can be more active. Anything that makes you breathe hard and gets your heart pumping is exercise. Here are some tips:  ? Walk to do errands or to take your child to school or the bus.  ? Go for a family bike ride after dinner instead of watching TV. Where can you learn more? Go to http://www.gray.com/  Enter M482 in the search box to learn more about \"A Healthy Lifestyle: Care Instructions. \"  Current as of: September 23, 2020               Content Version: 12.8  © 6842-6353 Healthwise, [employer].    Care instructions adapted under license by [employer] (which disclaims liability or warranty for this information). If you have questions about a medical condition or this instruction, always ask your healthcare professional. [employer] [place] any warranty or liability for your use of this information.

## 2021-08-18 LAB
ALBUMIN SERPL-MCNC: 4.1 G/DL (ref 4–5)
ALBUMIN/GLOB SERPL: 1.5 {RATIO} (ref 1.2–2.2)
ALP SERPL-CCNC: 70 IU/L (ref 48–121)
ALT SERPL-CCNC: 18 IU/L (ref 0–44)
AST SERPL-CCNC: 15 IU/L (ref 0–40)
BASOPHILS # BLD AUTO: 0.1 X10E3/UL (ref 0–0.2)
BASOPHILS NFR BLD AUTO: 1 %
BILIRUB SERPL-MCNC: 0.3 MG/DL (ref 0–1.2)
BUN SERPL-MCNC: 15 MG/DL (ref 6–24)
BUN/CREAT SERPL: 18 (ref 9–20)
CALCIUM SERPL-MCNC: 9.1 MG/DL (ref 8.7–10.2)
CHLORIDE SERPL-SCNC: 103 MMOL/L (ref 96–106)
CHOLEST SERPL-MCNC: 180 MG/DL (ref 100–199)
CO2 SERPL-SCNC: 21 MMOL/L (ref 20–29)
CREAT SERPL-MCNC: 0.82 MG/DL (ref 0.76–1.27)
EOSINOPHIL # BLD AUTO: 0.5 X10E3/UL (ref 0–0.4)
EOSINOPHIL NFR BLD AUTO: 6 %
ERYTHROCYTE [DISTWIDTH] IN BLOOD BY AUTOMATED COUNT: 13.4 % (ref 11.6–15.4)
EST. AVERAGE GLUCOSE BLD GHB EST-MCNC: 114 MG/DL
GLOBULIN SER CALC-MCNC: 2.8 G/DL (ref 1.5–4.5)
GLUCOSE SERPL-MCNC: 89 MG/DL (ref 65–99)
HBA1C MFR BLD: 5.6 % (ref 4.8–5.6)
HCT VFR BLD AUTO: 44.4 % (ref 37.5–51)
HDLC SERPL-MCNC: 38 MG/DL
HGB BLD-MCNC: 14.5 G/DL (ref 13–17.7)
IMM GRANULOCYTES # BLD AUTO: 0 X10E3/UL (ref 0–0.1)
IMM GRANULOCYTES NFR BLD AUTO: 0 %
IMP & REVIEW OF LAB RESULTS: NORMAL
LDLC SERPL CALC-MCNC: 102 MG/DL (ref 0–99)
LYMPHOCYTES # BLD AUTO: 1.9 X10E3/UL (ref 0.7–3.1)
LYMPHOCYTES NFR BLD AUTO: 22 %
MCH RBC QN AUTO: 27.5 PG (ref 26.6–33)
MCHC RBC AUTO-ENTMCNC: 32.7 G/DL (ref 31.5–35.7)
MCV RBC AUTO: 84 FL (ref 79–97)
MONOCYTES # BLD AUTO: 0.7 X10E3/UL (ref 0.1–0.9)
MONOCYTES NFR BLD AUTO: 8 %
NEUTROPHILS # BLD AUTO: 5.4 X10E3/UL (ref 1.4–7)
NEUTROPHILS NFR BLD AUTO: 63 %
PLATELET # BLD AUTO: 280 X10E3/UL (ref 150–450)
POTASSIUM SERPL-SCNC: 4.2 MMOL/L (ref 3.5–5.2)
PROT SERPL-MCNC: 6.9 G/DL (ref 6–8.5)
RBC # BLD AUTO: 5.27 X10E6/UL (ref 4.14–5.8)
SODIUM SERPL-SCNC: 139 MMOL/L (ref 134–144)
TESTOST FREE SERPL-MCNC: 5.6 PG/ML (ref 6.8–21.5)
TESTOST SERPL-MCNC: 219 NG/DL (ref 264–916)
TRIGL SERPL-MCNC: 236 MG/DL (ref 0–149)
TSH SERPL DL<=0.005 MIU/L-ACNC: 0.89 UIU/ML (ref 0.45–4.5)
VLDLC SERPL CALC-MCNC: 40 MG/DL (ref 5–40)
WBC # BLD AUTO: 8.7 X10E3/UL (ref 3.4–10.8)

## 2021-08-19 NOTE — PROGRESS NOTES
Triglycerides are little bit elevated. Please follow a lower carb lower sugar diet. Testosterone is low at 219. Increased exercise and weight loss can help bring this up. If you would like to explore testosterone placement further have him follow-up in 1 month in the morning for further labs. Diabetes marker is normal but up the upper end of normal so watch the carbs and sugars and again exercise and weight loss.

## 2021-08-19 NOTE — PROGRESS NOTES
Pt notified as below per Dr. Leif Alcaraz. Verbalizes understanding & is agreeable to plan. OV scheduled for 09/17/21.

## 2021-09-17 ENCOUNTER — OFFICE VISIT (OUTPATIENT)
Dept: FAMILY MEDICINE CLINIC | Age: 41
End: 2021-09-17
Payer: COMMERCIAL

## 2021-09-17 VITALS
HEART RATE: 66 BPM | HEIGHT: 67 IN | TEMPERATURE: 97.7 F | OXYGEN SATURATION: 98 % | SYSTOLIC BLOOD PRESSURE: 99 MMHG | DIASTOLIC BLOOD PRESSURE: 66 MMHG | RESPIRATION RATE: 16 BRPM | BODY MASS INDEX: 29.51 KG/M2 | WEIGHT: 188 LBS

## 2021-09-17 DIAGNOSIS — E29.1 HYPOGONADISM IN MALE: ICD-10-CM

## 2021-09-17 DIAGNOSIS — F41.9 ANXIETY: ICD-10-CM

## 2021-09-17 DIAGNOSIS — Z13.31 POSITIVE DEPRESSION SCREENING: Primary | ICD-10-CM

## 2021-09-17 PROCEDURE — 99214 OFFICE O/P EST MOD 30 MIN: CPT | Performed by: FAMILY MEDICINE

## 2021-09-17 RX ORDER — SERTRALINE HYDROCHLORIDE 50 MG/1
50 TABLET, FILM COATED ORAL DAILY
Qty: 90 TABLET | Refills: 1 | Status: SHIPPED | OUTPATIENT
Start: 2021-09-17 | End: 2021-10-26

## 2021-09-17 NOTE — PROGRESS NOTES
Chief Complaint   Patient presents with   [address]     Patient presents in office today for 1 month f/u to labs. Also doing a med check on the sertraline. No other concerns. 1. Have you been to the ER, urgent care clinic since your last visit? Hospitalized since your last visit? No    2. Have you seen or consulted any other health care providers outside of the [address] [place] [address] since your last visit? Include any pap smears or colon screening.  No    Learning Assessment 12/12/2013   PRIMARY LEARNER Patient   HIGHEST LEVEL OF EDUCATION - PRIMARY LEARNER  SOME COLLEGE   BARRIERS PRIMARY LEARNER OTHER   CO-LEARNER CAREGIVER No   PRIMARY LANGUAGE ENGLISH   LEARNER PREFERENCE PRIMARY DEMONSTRATION   ANSWERED BY patient   RELATIONSHIP SELF

## 2021-09-17 NOTE — PATIENT INSTRUCTIONS
A Healthy Lifestyle: Care Instructions  Your Care Instructions     A healthy lifestyle can help you feel good, stay at a healthy weight, and have plenty of energy for both work and play. A healthy lifestyle is something you can share with your whole family. A healthy lifestyle also can lower your risk for serious health problems, such as high blood pressure, heart disease, and diabetes. You can follow a few steps listed below to improve your health and the health of your family. Follow-up care is a key part of your treatment and safety. Be sure to make and go to all appointments, and call your doctor if you are having problems. It's also a good idea to know your test results and keep a list of the medicines you take. How can you care for yourself at home? · Do not eat too much sugar, fat, or fast foods. You can still have dessert and treats now and then. The goal is moderation. · Start small to improve your eating habits. Pay attention to portion sizes, drink less juice and soda pop, and eat more fruits and vegetables. ? Eat a healthy amount of food. A 3-ounce serving of meat, for example, is about the size of a deck of cards. Fill the rest of your plate with vegetables and whole grains. ? Limit the amount of soda and sports drinks you have every day. Drink more water when you are thirsty. ? Eat plenty of fruits and vegetables every day. Have an apple or some carrot sticks as an afternoon snack instead of a candy bar. Try to have fruits and/or vegetables at every meal.  · Make exercise part of your daily routine. You may want to start with simple activities, such as walking, bicycling, or slow swimming. Try to be active 30 to 60 minutes every day. You do not need to do all 30 to 60 minutes all at once. For example, you can exercise 3 times a day for 10 or 20 minutes.  Moderate exercise is safe for most people, but it is always a good idea to talk to your doctor before starting an exercise program.  · Keep moving. [place] the lawn, work in the garden, or [employer]. Take the stairs instead of the elevator at work. · If you smoke, quit. People who smoke have an increased risk for heart attack, stroke, cancer, and other lung illnesses. Quitting is hard, but there are ways to boost your chance of quitting tobacco for good. ? Use nicotine gum, patches, or lozenges. ? Ask your doctor about stop-smoking programs and medicines. ? Keep trying. In addition to reducing your risk of diseases in the future, you will notice some benefits soon after you stop using tobacco. If you have shortness of breath or asthma symptoms, they will likely get better within a few weeks after you quit. · Limit how much alcohol you drink. Moderate amounts of alcohol (up to 2 drinks a day for men, 1 drink a day for women) are okay. But drinking too much can lead to liver problems, high blood pressure, and other health problems. Family health  If you have a family, there are many things you can do together to improve your health. · Eat meals together as a family as often as possible. · Eat healthy foods. This includes fruits, vegetables, lean meats and dairy, and whole grains. · Include your family in your fitness plan. Most people think of activities such as jogging or tennis as the way to fitness, but there are many ways you and your family can be more active. Anything that makes you breathe hard and gets your heart pumping is exercise. Here are some tips:  ? Walk to do errands or to take your child to school or the bus.  ? Go for a family bike ride after dinner instead of watching TV. Where can you learn more? Go to http://www.gray.com/  Enter Y773 in the search box to learn more about \"A Healthy Lifestyle: Care Instructions. \"  Current as of: June 16, 2021               Content Version: 13.0  © 4483-4583 [employer], [employer].    Care instructions adapted under license by [employer] (which disclaims liability or warranty for this information). If you have questions about a medical condition or this instruction, always ask your healthcare professional. [address] any warranty or liability for your use of this information.

## 2021-09-17 NOTE — PROGRESS NOTES
Progress Note    he is a 42y.o. year old male who presents for evalution. Subjective:     Pt feels he is dragging all of the time. His T level was low and he would like to explore this further for possible treatment. States he was c/w taking zoloft daily but that consistency has dropped off. Taking around every other day. We fadi also go up on med since not working well enough. Reviewed PmHx, RxHx, FmHx, SocHx, AllgHx and updated and dated in the chart. Review of Systems - negative except as listed above in the HPI    Objective:     Vitals:    09/17/21 1119   BP: 99/66   Pulse: 66   Resp: 16   Temp: 97.7 °F (36.5 °C)   TempSrc: Oral   SpO2: 98%   Weight: 188 lb (85.3 kg)   Height: 5' 7\" (1.702 m)       Current Outpatient Medications   Medication Sig    sertraline (ZOLOFT) 50 mg tablet Take 1 Tablet by mouth daily.  nystatin (MYCOSTATIN) powder Apply  to affected area three (3) times daily. (Patient not taking: Reported on 9/17/2021)    cyclobenzaprine (FLEXERIL) 10 mg tablet Take 1 Tab by mouth three (3) times daily as needed for Muscle Spasm(s). (Patient not taking: Reported on 7/7/2021)    doxycycline (MONODOX) 100 mg capsule Take 1 Cap by mouth two (2) times a day. (Patient not taking: Reported on 7/7/2021)    fluticasone (FLONASE) 50 mcg/actuation nasal spray 2 Sprays by Both Nostrils route daily for 90 days. Long term, three month supply  Indications: ALLERGIC RHINITIS     No current facility-administered medications for this visit. Physical Examination: General appearance - alert, well appearing, and in no distress  Mental status - alert, oriented to person, place, and time  Chest - clear to auscultation, no wheezes, rales or rhonchi, symmetric air entry  Heart - normal rate, regular rhythm, normal S1, S2, no murmurs, rubs, clicks or gallops      Assessment/ Plan:   Diagnoses and all orders for this visit:    1. Positive depression screening    2.  Anxiety  - sertraline (ZOLOFT) 50 mg tablet; Take 1 Tablet by mouth daily. He will follow up in 6 weeks regarding the Zoloft. If you would like to go apart on the Zoloft in a couple of weeks advised that he can take to 50 for 2 weeks daily and then increase to 50+25 to 75 mg daily. 3. Hypogonadism in male  -     TESTOSTERONE, FREE & TOTAL; Future  [facility]; Future  Discussed if further labs were needed I would just order these and he would not need to be seen. Follow-up and Dispositions    · Return in about 6 weeks (around 10/29/2021), or if symptoms worsen or fail to improve. I have discussed the diagnosis with the patient and the intended plan as seen in the above orders. The patient has received an after-visit summary and questions were answered concerning future plans. Pt conveyed understanding of plan. Medication Side Effects and Warnings were discussed with patient    An electronic signature was used to authenticate this note  Irene Miner DO    Depression screen positive, PHQ 9 Score: 16, medication was prescribed, drug therapy education given - patient will call for any significant medication side effects or worsening symptoms of depression.

## 2021-09-29 DIAGNOSIS — E29.1 HYPOGONADISM IN MALE: Primary | ICD-10-CM

## 2021-09-29 LAB
FSH SERPL-ACNC: 3.7 MIU/ML (ref 1.5–12.4)
LH SERPL-ACNC: 2.1 MIU/ML (ref 1.7–8.6)
TESTOST FREE SERPL-MCNC: 6.1 PG/ML (ref 6.8–21.5)
TESTOST SERPL-MCNC: 212 NG/DL (ref 264–916)

## 2021-09-29 NOTE — PROGRESS NOTES
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 He requires some further labs.   You can  a copy of these to have drawn at a [facility].  They need to be drawn early morning around 8 AM.

## 2021-10-01 NOTE — PROGRESS NOTES
Called and spoke with patient's wife Rubens Scott in reference to labs.  She will call back with which lab [facility] they will go to and I will fax over the lab order for them

## 2021-10-04 ENCOUNTER — TELEPHONE (OUTPATIENT)
Dept: FAMILY MEDICINE CLINIC | Age: 41
End: 2021-10-04

## 2021-10-04 NOTE — TELEPHONE ENCOUNTER
Patient would like to have the lab slips printed so he can go to [facility] to have drawn. Wife Maricarmen Lombardi will .  She is on HIPPA

## 2021-10-07 LAB
CORTIS AM PEAK SERPL-MCNC: 17.8 UG/DL (ref 6.2–19.4)
IRON SERPL-MCNC: 88 UG/DL (ref 38–169)
PROLACTIN SERPL-MCNC: 16 NG/ML (ref 4–15.2)
TRANSFERRIN SERPL-MCNC: 257 MG/DL (ref 177–329)

## 2021-10-08 NOTE — PROGRESS NOTES
Your prolactin levels ever so slightly elevated. However, this is likely due to the Zoloft. We can go ahead and get started with testosterone therapy if you are not planning to have children in the future. Please check with your insurance to see what form of testosterone therapy they will cover and sent me a message back and let me know. There is topicals and then there are injections.

## 2021-10-19 NOTE — PROGRESS NOTES
Called and spoke with wife Stacy (on HIPAA) in reference to labs.  She states that she will discuss the results with patient as they are planning on possibly having children so at this time they likely hill hold off on the testosterone therapy and will send Dr. Gurinder Hi a mychart message

## 2021-10-20 ENCOUNTER — HOSPITAL ENCOUNTER (EMERGENCY)
Age: 41
Discharge: HOME | DRG: 885 | End: 2021-10-20
Attending: EMERGENCY MEDICINE | Admitting: EMERGENCY MEDICINE
Payer: COMMERCIAL

## 2021-10-20 ENCOUNTER — TELEPHONE (OUTPATIENT)
Dept: FAMILY MEDICINE CLINIC | Age: 41
End: 2021-10-20

## 2021-10-20 ENCOUNTER — NURSE TRIAGE (OUTPATIENT)
Dept: OTHER | Facility: CLINIC | Age: 41
End: 2021-10-20

## 2021-10-20 VITALS
TEMPERATURE: 98.1 F | HEART RATE: 86 BPM | SYSTOLIC BLOOD PRESSURE: 155 MMHG | DIASTOLIC BLOOD PRESSURE: 90 MMHG | RESPIRATION RATE: 16 BRPM | OXYGEN SATURATION: 99 %

## 2021-10-20 DIAGNOSIS — T38.0X5A ADVERSE EFFECT OF ADRENAL CORTICAL STEROIDS, INITIAL ENCOUNTER: Primary | ICD-10-CM

## 2021-10-20 DIAGNOSIS — T50.905A MEDICATION SIDE EFFECT, INITIAL ENCOUNTER: ICD-10-CM

## 2021-10-20 DIAGNOSIS — F30.9 MANIA (HCC): ICD-10-CM

## 2021-10-20 PROCEDURE — 99282 EMERGENCY DEPT VISIT SF MDM: CPT

## 2021-10-20 NOTE — ED TRIAGE NOTES
Patient presents from home with complaints of \"I think i'm manic\". Patient reports history of ADHD and reports he \"self diagnosed myself as bipolar\".  Patient was recently on an antiviral and steroid pack for shingles

## 2021-10-20 NOTE — DISCHARGE INSTRUCTIONS
You can try stopping the steroids and only take the antivirals as it seems that the steroids are causing the majority of your symptoms.

## 2021-10-20 NOTE — TELEPHONE ENCOUNTER
Received call from Flor at [facility] with Red Flag Complaint. Brief description of triage: manic behaviors    Triage indicates for patient to see PCP within 24 hours. Wife and pt informed if unable to get an appointment to go to urgent care of Emergency Department. Agreeable with plan. Care advice provided, patient verbalizes understanding; denies any other questions or concerns; instructed to call back for any new or worsening symptoms. Writer provided warm transfer to [place] at [facility] for appointment scheduling. Attention Provider: Thank you for allowing me to participate in the care of your patient. The patient was connected to triage in response to information provided to the [facility]. Please do not respond through this encounter as the response is not directed to a shared pool. Reason for Disposition   Increasing restlessness or pacing    Answer Assessment - Initial Assessment Questions  1. LEVEL OF CONSCIOUSNESS: \"How is he (she, the patient) acting right now? \" (e.g., alert-oriented, confused, lethargic, stuporous, comatose)      Not confused at this time. 2. ONSET: \"When did the confusion start? \"  (minutes, hours, days)      Monday started texting friends to come over. Over night was texing last night as well. Plan to make a lot of money. 10 times the investment. \"it's a real thing\" no concern for hurt to self. 3. PATTERN \"Does this come and go, or has it been constant since it started? \"  \"Is it present now? \"      Talking a mile a minute. Comes and goes. States gets excited before he notices it. 4. ALCOHOL or DRUGS: \"Has he been drinking alcohol or taking any drugs? \"       No concern    5. NARCOTIC MEDICATIONS: \"Has he been receiving any narcotic medications? \" (e.g., morphine, Vicodin)     No \"just caffeine\" states feels like he had a ton of uppers and can't sleep\"    6. CAUSE: \"What do you think is causing the confusion? \"       Onset s/p starting Acyclovir for Shingles. 7. OTHER SYMPTOMS: \"Are there any other symptoms? \" (e.g., difficulty breathing, headache, fever, weakness)      Shingles started to improve. Continues to itch. Answer Assessment - Initial Assessment Questions  1. CONCERN: \"What happened that made you call today? \"      texting people Monday and last night. Speaking rapidly. grandious thinking    2. BIPOLAR SYMPTOM SCREENING: \"How are you feeling overall, is your bipolar disorder under good control? \"    - ELIDA SYMPTOMS (e.g., increased energy, decreased sleeping, hyperactivity, grandiosity)     - DEPRESSION SYMPTOMS (e.g., decreased energy, increased sleeping or difficulty sleeping, difficulty concentrating, feelings of sadness, guilt, hopelessness, or worthlessness)      States feeling like he drank a lot of caffeine and cannot sleep    3. RISK OF HARM - SUICIDAL IDEATION:  \"Do you ever have thoughts of hurting or killing yourself? \"  (e.g., yes, no, no but preoccupation with thoughts about death)    - INTENT:  \"Do you have thoughts of hurting or killing yourself right NOW? \" (e.g., yes, no, N/A)    - PLAN: \"Do you have a specific plan for how you would do this? \" (e.g., gun, knife, overdose, no plan, N/A)      No    4. RISK OF HARM - HOMICIDAL IDEATION:  \"Do you ever have thoughts of hurting or killing someone else? \"  (e.g., yes, no, no but preoccupation with thoughts about death)    - INTENT:  \"Do you have thoughts of hurting or killing someone right NOW? \" (e.g., yes, no, N/A)    - PLAN: \"Do you have a specific plan for how you would do this? \" (e.g., gun, knife, no plan, N/A)       No    5. FUNCTIONAL IMPAIRMENT: \"How have things been going for you overall in your life? Have you had any more difficulties than usual doing your normal daily activities? \"  (e.g., better, same, worse; self-care, school, work, interactions)      Shower. Able to do self care. Restlessness. 6. SUPPORT: \"Who is with you now? \" \"Who do you live with?\" \"Do you have family or friends nearby who you can talk to?\"       Wife present    9. THERAPIST: \"Do you have a counselor or therapist? Name? \"     Couples counseling years ago. 8. STRESSORS: \"Has there been any new stress or recent changes in your life? \"      Started using Acyclovir and Prednisone 60mg daily for Shingles    9. DRUG ABUSE/ALCOHOL: \"Do you drink alcohol or use any illegal drugs? \"       No    10. OTHER: \"Do you have any other health or medical symptoms right now? \" (e.g., fever)        No fever. 11. PREGNANCY: \"Is there any chance you are pregnant? \" \"When was your last menstrual period? \"        n/a    Protocols used: BIPOLAR DISORDER (MANIC DEPRESSION)-ADULT-AH, CONFUSION - DELIRIUM-ADULT-[place]

## 2021-10-20 NOTE — ED PROVIDER NOTES
50-year-old male with a history of ADHD, depression, anxiety, presents to the emergency department stating that last week he developed shingles to his left lower extremity and was Rx'd Valtrex and prednisone taper pack which she has been taking with some improvement in his rash. He states that since starting these medications however over the last couple days he has developed more jorge type symptoms and his typical ADHD. He has had decreased sleep and feels like his speech is more rapid and pressured than it normally is. He also has been drinking a lot of caffeine and states that this might not be helping. He expresses some interest in establishing care with a mental health provider as an outpatient but denies any SI, HI, AVH. He has still been able to go about his normal routine and care for himself as well as take care of his family although his wife states that he seems to be acting strange since starting this medication. Past Medical History:   Diagnosis Date    ADHD (attention deficit hyperactivity disorder) 5/1/2013    Depression 5/1/2013       No past surgical history on file. History reviewed. No pertinent family history. Social History     Socioeconomic History    Marital status:      Spouse name: Not on file    Number of children: Not on file    Years of education: Not on file    Highest education level: Not on file   Occupational History    Not on file   Tobacco Use    Smoking status: Passive Smoke Exposure - Never Smoker    Smokeless tobacco: Never Used   Substance and Sexual Activity    Alcohol use:  Yes    Drug use: Not on file    Sexual activity: Not on file   Other Topics Concern    Not on file   Social History Narrative    Not on file     Social Determinants of Health     Financial Resource Strain:     Difficulty of Paying Living Expenses:    Food Insecurity:     Worried About Running Out of Food in the Last Year:     [address] in the Last Year: Transportation Needs:     Lack of Transportation (Medical):  Lack of Transportation (Non-Medical):    Physical Activity:     Days of Exercise per Week:     Minutes of Exercise per Session:    Stress:     Feeling of Stress :    Social Connections:     Frequency of Communication with Friends and Family:     Frequency of Social Gatherings with Friends and Family:     Attends [employer] Services:     Active Member of Clubs or Organizations:     Attends Club or Organization Meetings:     Marital Status:    Intimate Partner Violence:     Fear of Current or Ex-Partner:     Emotionally Abused:     Physically Abused:     Sexually Abused: ALLERGIES: Patient has no known allergies. Review of Systems   Constitutional: Negative for activity change, appetite change, chills and fever. HENT: Negative for congestion, rhinorrhea, sinus pain, sneezing and sore throat. Eyes: Negative for photophobia and visual disturbance. Respiratory: Negative for cough and shortness of breath. Cardiovascular: Negative for chest pain. Gastrointestinal: Negative for abdominal pain, blood in stool, constipation, diarrhea, nausea and vomiting. Genitourinary: Negative for difficulty urinating, dysuria, flank pain, hematuria, penile pain and testicular pain. Musculoskeletal: Negative for arthralgias, back pain, myalgias and neck pain. Skin: Positive for rash. Negative for wound. Neurological: Negative for syncope, weakness, light-headedness, numbness and headaches. Psychiatric/Behavioral: Positive for decreased concentration and sleep disturbance. Negative for agitation, behavioral problems, confusion, dysphoric mood, hallucinations, self-injury and suicidal ideas. The patient is hyperactive. The patient is not nervous/anxious. All other systems reviewed and are negative.       Vitals:    10/20/21 1323   BP: (!) 155/90   Pulse: 86   Resp: 16   Temp: 98.1 °F (36.7 °C)   SpO2: 99%            Physical Exam  Vitals and nursing note reviewed. Constitutional:       General: He is not in acute distress. Appearance: Normal appearance. He is well-developed. He is not diaphoretic. HENT:      Head: Normocephalic and atraumatic. Nose: Nose normal.   Eyes:      Extraocular Movements: Extraocular movements intact. Conjunctiva/sclera: Conjunctivae normal.      Pupils: Pupils are equal, round, and reactive to light. Cardiovascular:      Rate and Rhythm: Normal rate and regular rhythm. Heart sounds: Normal heart sounds. Pulmonary:      Effort: Pulmonary effort is normal.      Breath sounds: Normal breath sounds. Abdominal:      General: There is no distension. Palpations: Abdomen is soft. Tenderness: There is no abdominal tenderness. Musculoskeletal:         General: No tenderness. Cervical back: Neck supple. Skin:     General: Skin is warm and dry. Neurological:      General: No focal deficit present. Mental Status: He is alert and oriented to person, place, and time. Cranial Nerves: No cranial nerve deficit. Sensory: No sensory deficit. Motor: No weakness. Coordination: Coordination normal.   Psychiatric:         Attention and Perception: Attention normal.         Mood and Affect: Mood normal.         Speech: Speech is rapid and pressured. Behavior: Behavior is hyperactive. Behavior is cooperative. Thought Content: Thought content does not include homicidal or suicidal ideation. Thought content does not include homicidal or suicidal plan. Cognition and Memory: Cognition normal.         Judgment: Judgment normal.          MDM   19-year-old male presents with likely medication side effects from steroids Rx'd for treatment of shingles. Does not seem to be an acute threat to himself or others and no hallucinations just hyperactive and pressured rapid speech.   He was recommended to continue taking the Rx antivirals but consider stopping the steroids as this is likely precipitating his symptoms. He was provided with a list of mental health providers to follow-up with if he would like as an outpatient. Return precautions were given for worsening or concerns. This plan was discussed with the patient at the bedside and he stated both understanding and agreement.     Procedures

## 2021-10-20 NOTE — TELEPHONE ENCOUNTER
Called and spoke with wife to follow up as she also spoke with the nurse triage who advised for patient to go to ED for eval. She states that they are trying to see if someone can watch their son so that she can take him to the ED however she does have an apt for tomorrow at 9:30 with Lety Fry. She states that he wasn't acting himself and patient was also on the phone advising that he recognizes that he wasn't acting himself. He said that he feels fine but that he thinks he was experiencing a borderline personality disorder. He states that his filter was gone and was cursing a lot and felt angry. He states that he recognizes that he needs to be evaluated. Advised to wife that if there is any concern that he may hurt himself or others to immediately take him to the ED. Wife verbalized understanding.

## 2021-10-20 NOTE — TELEPHONE ENCOUNTER
Reason for Disposition  Sánchez Caller has already spoken with another triager and has no further questions. Protocols used: NO CONTACT OR DUPLICATE CONTACT CALL-ADULT-AH    Brief description of triage: pt is possibly manic and wife spoke to PCP office earlier today and was told to take  to ED due to no appts at office today. Wife was calling to make sure it was noted for insurance purposes. Attention Provider: Thank you for allowing me to participate in the care of your patient. The patient was connected to triage in response to symptoms provided. Please do not respond through this encounter as the response is not directed to a shared pool.

## 2021-10-20 NOTE — TELEPHONE ENCOUNTER
We did not prescribe anything for this so they must of been seen somewhere else. If they are prescribed prednisone it could be the prednisone. Otherwise normally they would be taking Valtrex 1 3 times a day for a week.

## 2021-10-20 NOTE — TELEPHONE ENCOUNTER
Wife Enrico Licea states that patient was given med for  shingles & he now having manic  behavior which he has never had before.

## 2021-10-21 ENCOUNTER — OFFICE VISIT (OUTPATIENT)
Dept: FAMILY MEDICINE CLINIC | Age: 41
End: 2021-10-21
Payer: COMMERCIAL

## 2021-10-21 ENCOUNTER — PATIENT OUTREACH (OUTPATIENT)
Dept: OTHER | Age: 41
End: 2021-10-21

## 2021-10-21 VITALS
OXYGEN SATURATION: 98 % | BODY MASS INDEX: 28.41 KG/M2 | TEMPERATURE: 98.1 F | HEIGHT: 67 IN | DIASTOLIC BLOOD PRESSURE: 69 MMHG | HEART RATE: 61 BPM | SYSTOLIC BLOOD PRESSURE: 109 MMHG | WEIGHT: 181 LBS

## 2021-10-21 DIAGNOSIS — F32.A DEPRESSION, UNSPECIFIED DEPRESSION TYPE: ICD-10-CM

## 2021-10-21 DIAGNOSIS — T50.905A ADVERSE EFFECT OF DRUG, INITIAL ENCOUNTER: Primary | ICD-10-CM

## 2021-10-21 DIAGNOSIS — B02.9 HERPES ZOSTER WITHOUT COMPLICATION: ICD-10-CM

## 2021-10-21 PROCEDURE — 99213 OFFICE O/P EST LOW 20 MIN: CPT | Performed by: FAMILY MEDICINE

## 2021-10-21 NOTE — PROGRESS NOTES
Bridger Montes is a 42 y.o. male , id x 2(name and ). Reviewed record, history, and  medications. Chief Complaint   Patient presents with    Allergic Reaction     to medication for shingles        Vitals:    10/21/21 0928   BP: 109/69   Pulse: 61   Temp: 98.1 °F (36.7 °C)   TempSrc: Oral   SpO2: 98%   Weight: 181 lb (82.1 kg)   Height: 5' 7\" (1.702 m)       Coordination of Care Questionnaire:   1) Have you been to an emergency room, urgent care, or hospitalized since your last visit? yes       2. Have seen or consulted any other health care provider since your last visit? no      3 most recent PHQ Screens 2021   Little interest or pleasure in doing things More than half the days   Feeling down, depressed, irritable, or hopeless Nearly every day   Total Score PHQ 2 5   Trouble falling or staying asleep, or sleeping too much Nearly every day   Feeling tired or having little energy Nearly every day   Poor appetite, weight loss, or overeating Not at all   Feeling bad about yourself - or that you are a failure or have let yourself or your family down Several days   Trouble concentrating on things such as school, work, reading, or watching TV Nearly every day   Moving or speaking so slowly that other people could have noticed; or the opposite being so fidgety that others notice Several days   Thoughts of being better off dead, or hurting yourself in some way Not at all   PHQ 9 Score 16   How difficult have these problems made it for you to do your work, take care of your home and get along with others Not difficult at all       Patient is accompanied by wife I have received verbal consent from Bridger Montes to discuss any/all medical information while they are present in the room.

## 2021-10-21 NOTE — PATIENT INSTRUCTIONS

## 2021-10-21 NOTE — PROGRESS NOTES
HPRR progress note    Patient eligible for Cresencio Jeferson Mejia 994 care management  Discussed the care management program with patient's wife, after he requested this [facility] talk with her and gave verbal permission for her to speak on his behalf. Patient agrees to care management services at this time. PMH:   Past Medical History:   Diagnosis Date    ADHD (attention deficit hyperactivity disorder) 5/1/2013    Depression 5/1/2013       Social History:   Social History     Socioeconomic History    Marital status:      Spouse name: Not on file    Number of children: Not on file    Years of education: Not on file    Highest education level: Not on file   Occupational History    Not on file   Tobacco Use    Smoking status: Passive Smoke Exposure - Never Smoker    Smokeless tobacco: Never Used   Substance and Sexual Activity    Alcohol use: Yes    Drug use: Not on file    Sexual activity: Not on file   Other Topics Concern    Not on file   Social History Narrative    Not on file     Social Determinants of Health     Financial Resource Strain:     Difficulty of Paying Living Expenses:    Food Insecurity:     Worried About Running Out of Food in the Last Year:     [address] in the Last Year:    Transportation Needs:     Lack of Transportation (Medical):      Lack of Transportation (Non-Medical):    Physical Activity:     Days of Exercise per Week:     Minutes of Exercise per Session:    Stress:     Feeling of Stress :    Social Connections:     Frequency of Communication with Friends and Family:     Frequency of Social Gatherings with Friends and Family:     Attends Bahai Services:     Active Member of Clubs or Organizations:     Attends Club or Organization Meetings:     Marital Status:    Intimate Partner Violence:     Fear of Current or Ex-Partner:     Emotionally Abused:     Physically Abused:     Sexually Abused:          Patient's primary care provider relationship reviewed with patient and modified, as applicable. Care management assessment completed:  Patient went to the ED related to reaction to his medication for the treatment of shingles, adrenal cortical steroids. He is feeling better and the shingles are healing. Patient is no longer taking the medication. He began having what he and his wife termed as \"jorge\". He is diagnosed with depression and ADHD, however while in the ED, he self diagnosed himself as \"bipolar\", while in his manic phase. Patient's wife was not able to be in the room with him and was very disappointed that he did not have some representing him while he was not stable. Patient indicated in the ED, that he would consider see a counselor. His wife however, would like to wait to see what happens once the medications are completely out of his system. He had an appointment with his PCP this AM.   Wife and patient requested that this ECM reach out after a few weeks and his next appointment on 11/12. Medications:  New Medications at Discharge: None  Changed Medications at Discharge: None  Discontinued Medications at Discharge: None  Current Outpatient Medications   Medication Sig    sertraline (ZOLOFT) 50 mg tablet Take 1 Tablet by mouth daily.  fluticasone (FLONASE) 50 mcg/actuation nasal spray 2 Sprays by Both Nostrils route daily for 90 days. Long term, three month supply  Indications: ALLERGIC RHINITIS     No current facility-administered medications for this visit. There are no discontinued medications. Performed medication reconciliation with patient, and patient verbalizes understanding of administration of home medications. There were no barriers to obtaining medications identified at this time.     Preventive Care     Health Maintenance   Topic Date Due    Hepatitis C Screening  Never done    COVID-19 Vaccine (1) Never done    DTaP/Tdap/Td series (1 - Tdap) Never done    Flu Vaccine (1) Never done    Lipid Screen 08/13/2026    Pneumococcal 0-64 years  Aged Out         Goals   Goals    None          Barriers/Support system:  patient and spouse      Barriers/Challenges to Care: []  Decline in memory    []  Language barrier     []  Emotional                  []  Limited mobility  []  Lack of motivation     [] Vision, hearing or cognitive impairment [x]  Knowledge [] Financial Barriers []  Lack of support  []  Pain []  Other     PCP/Specialist follow up:   Future Appointments   Date Time Provider Michael Zarcoi   11/12/2021 11:15 AM Isma Mehta,  IFP BS AMB      Reviewed red flags with patient, and patient verbalizes understanding. Patient given an opportunity to ask questions. No other clinical/social/functional needs noted. The patient agrees to contact the PCP office for questions related to their healthcare. The patient expressed thanks, offered no additional questions and ended the call. Plan for next call: Follow up post PCP appointment to determine next steps of care.

## 2021-10-21 NOTE — PROGRESS NOTES
Neeta Lebron (: 1980) is a 42 y.o. male, established patient, here for evaluation of the following chief complaint(s): Allergic Reaction (to medication for shingles )         ASSESSMENT/PLAN:  Below is the assessment and plan developed based on review of pertinent history, physical exam, labs, studies, and medications. 1. Adverse effect of drug, initial encounter  Likely Adverse reaction from the prednisone. He has been off prednisone and valacyclovir for 48 hours and significantly improved. No history of manic episodes, he has been taking either Wellbutrin or zoloft for several years. Advised patient not to restart prednisone. Monitor symptoms, it may take a week for symptoms of prednisone to resolve. 2. Herpes zoster without complication  Well healing lesions. No continued nerve pain. After discussion with patient, elect to not restart valacyclovir due to well healing lesions, lack of continued pain and potential the reaction was also due to valacyclovir. Patient to monitor rash and return to office if sx worsen or fail to improve. 3. Depression, unspecified depression type  Continue zoloft 50mg. Follow up with Dr. Meghan Antonio for mood in 1 month as previously scheduled. Return for As previously scheduled. SUBJECTIVE/OBJECTIVE:  Chief Complaint   Patient presents with    Allergic Reaction     to medication for shingles      Patient is a 43 yo male with history of depression and ADHD presenting to office for adverse medication reaction. Patient states last week he developed burning nerve pain on left side of back and left thigh for 4 days followed by outbreak of vesicular rash. He saw [facility] and diagnosed with shingles. He was started of valacyclovir as well as prednisone 60mg daily. Patient and wife report 48 hours after starting these medications he became very talkative.  Reports he starting texting their friends long, strange texts, inviting lots of people over, and posting excessively on facebook. He slept a total of 8 hours between Sunday and Wednesday. They were concerned for a manic episode, his father has bipolar disorder. They were advised by triage nurse at [address] to go to ED. At ED, patient states wife was not allowed back and he told the ED physician he felt great but his wife thought he was manic. He was discharged with no further treatment. In office today states he has been off prednisone and valacyclovir for 48 hours and feels significantly better. He slept 8 hour last night. Reports he still feels keyed up and talkative but his thinking has returned to rational thought process and he can look back at texts and facebook posts and acknowledges they did not make sense. During this episode he denies spending or thinking of spending large sums of money, making erratic decisions, thoughts of harming himself or others. For shingles, he reports no more nerve pain. Reports lesions have scabbed over and continue to heal. He does not want to restart either of the medications. He continues to take zoloft 50mg daily for depression. Before prednisone he thinks this medication was working well, but reports he almost liked the feelings of happiness he felt while starting the prednisone. Review of Systems   Constitutional: Negative for chills, fatigue and fever. HENT: Negative for hearing loss. Eyes: Negative for pain and visual disturbance. Respiratory: Negative for cough, chest tightness, shortness of breath and wheezing. Cardiovascular: Negative for chest pain, palpitations and leg swelling. Gastrointestinal: Negative for abdominal distention, abdominal pain, constipation, diarrhea, nausea and vomiting. Neurological: Negative for dizziness, weakness, light-headedness and headaches.        Current Outpatient Medications on File Prior to Visit   Medication Sig Dispense Refill    sertraline (ZOLOFT) 50 mg tablet Take 1 Tablet by mouth daily. 90 Tablet 1    [DISCONTINUED] nystatin (MYCOSTATIN) powder Apply  to affected area three (3) times daily. (Patient not taking: Reported on 9/17/2021) 1 Bottle 2    [DISCONTINUED] cyclobenzaprine (FLEXERIL) 10 mg tablet Take 1 Tab by mouth three (3) times daily as needed for Muscle Spasm(s). (Patient not taking: Reported on 7/7/2021) 45 Tab 1    fluticasone (FLONASE) 50 mcg/actuation nasal spray 2 Sprays by Both Nostrils route daily for 90 days. Long term, three month supply  Indications: ALLERGIC RHINITIS 3 Bottle 3    [DISCONTINUED] doxycycline (MONODOX) 100 mg capsule Take 1 Cap by mouth two (2) times a day. (Patient not taking: Reported on 7/7/2021) 20 Cap 0     No current facility-administered medications on file prior to visit. Vitals:    10/21/21 0928   BP: 109/69   Pulse: 61   Temp: 98.1 °F (36.7 °C)   TempSrc: Oral   SpO2: 98%   Weight: 181 lb (82.1 kg)   Height: 5' 7\" (1.702 m)   PainSc:   0 - No pain        Physical Exam  Constitutional:       Appearance: Normal appearance. HENT:      Head: Normocephalic and atraumatic. Cardiovascular:      Rate and Rhythm: Normal rate and regular rhythm. Pulmonary:      Effort: Pulmonary effort is normal.   Skin:     Findings: Rash present. Rash is crusting. Comments: Many erythematous, crusted lesions and many erythematous papules throughout left sided L3 nerve distribution. No vesicles present   Neurological:      Mental Status: He is alert and oriented to person, place, and time. Psychiatric:         Attention and Perception: Attention normal.         Mood and Affect: Mood and affect normal.         Speech: Speech normal.         Behavior: Behavior normal.         Thought Content: Thought content normal.         An electronic signature was used to authenticate this note.   -- Adam Arellano PA-C

## 2021-10-22 ENCOUNTER — HOSPITAL ENCOUNTER (INPATIENT)
Age: 41
LOS: 4 days | Discharge: HOME | DRG: 885 | End: 2021-10-26
Attending: EMERGENCY MEDICINE | Admitting: PSYCHIATRY & NEUROLOGY
Payer: COMMERCIAL

## 2021-10-22 ENCOUNTER — NURSE TRIAGE (OUTPATIENT)
Dept: OTHER | Facility: CLINIC | Age: 41
End: 2021-10-22

## 2021-10-22 DIAGNOSIS — F30.9 MANIA (HCC): Primary | ICD-10-CM

## 2021-10-22 PROBLEM — F39 UNSPECIFIED MOOD (AFFECTIVE) DISORDER (HCC): Status: ACTIVE | Noted: 2021-10-22

## 2021-10-22 LAB
ALBUMIN SERPL-MCNC: 3.8 G/DL (ref 3.5–5)
ALBUMIN/GLOB SERPL: 0.9 {RATIO} (ref 1.1–2.2)
ALP SERPL-CCNC: 67 U/L (ref 45–117)
ALT SERPL-CCNC: 33 U/L (ref 12–78)
AMPHET UR QL SCN: NEGATIVE
ANION GAP SERPL CALC-SCNC: 4 MMOL/L (ref 5–15)
APAP SERPL-MCNC: <2 UG/ML (ref 10–30)
APPEARANCE UR: CLEAR
AST SERPL-CCNC: 24 U/L (ref 15–37)
BACTERIA URNS QL MICRO: NEGATIVE /HPF
BARBITURATES UR QL SCN: NEGATIVE
BASOPHILS # BLD: 0.1 K/UL (ref 0–0.1)
BASOPHILS NFR BLD: 1 % (ref 0–1)
BENZODIAZ UR QL: NEGATIVE
BILIRUB SERPL-MCNC: 0.8 MG/DL (ref 0.2–1)
BILIRUB UR QL: NEGATIVE
BUN SERPL-MCNC: 20 MG/DL (ref 6–20)
BUN/CREAT SERPL: 22 (ref 12–20)
CALCIUM SERPL-MCNC: 9 MG/DL (ref 8.5–10.1)
CANNABINOIDS UR QL SCN: POSITIVE
CHLORIDE SERPL-SCNC: 103 MMOL/L (ref 97–108)
CO2 SERPL-SCNC: 29 MMOL/L (ref 21–32)
COCAINE UR QL SCN: NEGATIVE
COLOR UR: NORMAL
CREAT SERPL-MCNC: 0.91 MG/DL (ref 0.7–1.3)
DIFFERENTIAL METHOD BLD: ABNORMAL
DRUG SCRN COMMENT,DRGCM: ABNORMAL
EOSINOPHIL # BLD: 0.4 K/UL (ref 0–0.4)
EOSINOPHIL NFR BLD: 4 % (ref 0–7)
EPITH CASTS URNS QL MICRO: NORMAL /LPF
ERYTHROCYTE [DISTWIDTH] IN BLOOD BY AUTOMATED COUNT: 12.8 % (ref 11.5–14.5)
ETHANOL SERPL-MCNC: <10 MG/DL
FLUAV RNA SPEC QL NAA+PROBE: NOT DETECTED
FLUBV RNA SPEC QL NAA+PROBE: NOT DETECTED
GLOBULIN SER CALC-MCNC: 4.1 G/DL (ref 2–4)
GLUCOSE SERPL-MCNC: 91 MG/DL (ref 65–100)
GLUCOSE UR STRIP.AUTO-MCNC: NEGATIVE MG/DL
HCT VFR BLD AUTO: 42.8 % (ref 36.6–50.3)
HGB BLD-MCNC: 14.5 G/DL (ref 12.1–17)
HGB UR QL STRIP: NEGATIVE
HYALINE CASTS URNS QL MICRO: NORMAL /LPF (ref 0–5)
IMM GRANULOCYTES # BLD AUTO: 0 K/UL (ref 0–0.04)
IMM GRANULOCYTES NFR BLD AUTO: 0 % (ref 0–0.5)
KETONES UR QL STRIP.AUTO: NEGATIVE MG/DL
LEUKOCYTE ESTERASE UR QL STRIP.AUTO: NEGATIVE
LYMPHOCYTES # BLD: 2.1 K/UL (ref 0.8–3.5)
LYMPHOCYTES NFR BLD: 19 % (ref 12–49)
MAGNESIUM SERPL-MCNC: 2.4 MG/DL (ref 1.6–2.4)
MCH RBC QN AUTO: 27.9 PG (ref 26–34)
MCHC RBC AUTO-ENTMCNC: 33.9 G/DL (ref 30–36.5)
MCV RBC AUTO: 82.5 FL (ref 80–99)
METHADONE UR QL: NEGATIVE
MONOCYTES # BLD: 1 K/UL (ref 0–1)
MONOCYTES NFR BLD: 10 % (ref 5–13)
NEUTS SEG # BLD: 7.1 K/UL (ref 1.8–8)
NEUTS SEG NFR BLD: 66 % (ref 32–75)
NITRITE UR QL STRIP.AUTO: NEGATIVE
NRBC # BLD: 0 K/UL (ref 0–0.01)
NRBC BLD-RTO: 0 PER 100 WBC
OPIATES UR QL: NEGATIVE
PCP UR QL: NEGATIVE
PH UR STRIP: 7 [PH] (ref 5–8)
PLATELET # BLD AUTO: 341 K/UL (ref 150–400)
PMV BLD AUTO: 8.7 FL (ref 8.9–12.9)
POTASSIUM SERPL-SCNC: 3.7 MMOL/L (ref 3.5–5.1)
PROT SERPL-MCNC: 7.9 G/DL (ref 6.4–8.2)
PROT UR STRIP-MCNC: NEGATIVE MG/DL
RBC # BLD AUTO: 5.19 M/UL (ref 4.1–5.7)
RBC #/AREA URNS HPF: NORMAL /HPF (ref 0–5)
SALICYLATES SERPL-MCNC: <1.7 MG/DL (ref 2.8–20)
SARS-COV-2, COV2: NOT DETECTED
SODIUM SERPL-SCNC: 136 MMOL/L (ref 136–145)
SP GR UR REFRACTOMETRY: 1.01 (ref 1–1.03)
TSH SERPL DL<=0.05 MIU/L-ACNC: 1.07 UIU/ML (ref 0.36–3.74)
UR CULT HOLD, URHOLD: NORMAL
UROBILINOGEN UR QL STRIP.AUTO: 0.2 EU/DL (ref 0.2–1)
WBC # BLD AUTO: 10.8 K/UL (ref 4.1–11.1)
WBC URNS QL MICRO: NORMAL /HPF (ref 0–4)

## 2021-10-22 PROCEDURE — 80307 DRUG TEST PRSMV CHEM ANLYZR: CPT

## 2021-10-22 PROCEDURE — 99284 EMERGENCY DEPT VISIT MOD MDM: CPT

## 2021-10-22 PROCEDURE — 74011250637 HC RX REV CODE- 250/637: Performed by: EMERGENCY MEDICINE

## 2021-10-22 PROCEDURE — 80179 DRUG ASSAY SALICYLATE: CPT

## 2021-10-22 PROCEDURE — 80143 DRUG ASSAY ACETAMINOPHEN: CPT

## 2021-10-22 PROCEDURE — 83735 ASSAY OF MAGNESIUM: CPT

## 2021-10-22 PROCEDURE — 36415 COLL VENOUS BLD VENIPUNCTURE: CPT

## 2021-10-22 PROCEDURE — 80053 COMPREHEN METABOLIC PANEL: CPT

## 2021-10-22 PROCEDURE — 87636 SARSCOV2 & INF A&B AMP PRB: CPT

## 2021-10-22 PROCEDURE — 65220000003 HC RM SEMIPRIVATE PSYCH

## 2021-10-22 PROCEDURE — 84443 ASSAY THYROID STIM HORMONE: CPT

## 2021-10-22 PROCEDURE — 81001 URINALYSIS AUTO W/SCOPE: CPT

## 2021-10-22 PROCEDURE — 93005 ELECTROCARDIOGRAM TRACING: CPT

## 2021-10-22 PROCEDURE — 82077 ASSAY SPEC XCP UR&BREATH IA: CPT

## 2021-10-22 PROCEDURE — 74011250637 HC RX REV CODE- 250/637: Performed by: NURSE PRACTITIONER

## 2021-10-22 PROCEDURE — 85025 COMPLETE CBC W/AUTO DIFF WBC: CPT

## 2021-10-22 PROCEDURE — 90791 PSYCH DIAGNOSTIC EVALUATION: CPT

## 2021-10-22 RX ORDER — HYDROXYZINE 50 MG/1
50 TABLET, FILM COATED ORAL
Status: DISCONTINUED | OUTPATIENT
Start: 2021-10-22 | End: 2021-10-26 | Stop reason: HOSPADM

## 2021-10-22 RX ORDER — HALOPERIDOL 5 MG/ML
5 INJECTION INTRAMUSCULAR
Status: DISCONTINUED | OUTPATIENT
Start: 2021-10-22 | End: 2021-10-26 | Stop reason: HOSPADM

## 2021-10-22 RX ORDER — ACETAMINOPHEN 325 MG/1
650 TABLET ORAL
Status: DISCONTINUED | OUTPATIENT
Start: 2021-10-22 | End: 2021-10-26 | Stop reason: HOSPADM

## 2021-10-22 RX ORDER — LORAZEPAM 2 MG/ML
1 INJECTION INTRAMUSCULAR
Status: DISCONTINUED | OUTPATIENT
Start: 2021-10-22 | End: 2021-10-26 | Stop reason: HOSPADM

## 2021-10-22 RX ORDER — DIPHENHYDRAMINE HYDROCHLORIDE 50 MG/ML
50 INJECTION, SOLUTION INTRAMUSCULAR; INTRAVENOUS
Status: DISCONTINUED | OUTPATIENT
Start: 2021-10-22 | End: 2021-10-26 | Stop reason: HOSPADM

## 2021-10-22 RX ORDER — TRAZODONE HYDROCHLORIDE 50 MG/1
50 TABLET ORAL
Status: DISCONTINUED | OUTPATIENT
Start: 2021-10-22 | End: 2021-10-26 | Stop reason: HOSPADM

## 2021-10-22 RX ORDER — ADHESIVE BANDAGE
30 BANDAGE TOPICAL DAILY PRN
Status: DISCONTINUED | OUTPATIENT
Start: 2021-10-22 | End: 2021-10-26 | Stop reason: HOSPADM

## 2021-10-22 RX ORDER — OLANZAPINE 5 MG/1
5 TABLET ORAL
Status: DISCONTINUED | OUTPATIENT
Start: 2021-10-22 | End: 2021-10-23

## 2021-10-22 RX ORDER — ALPRAZOLAM 0.5 MG/1
0.5 TABLET ORAL
Status: COMPLETED | OUTPATIENT
Start: 2021-10-22 | End: 2021-10-22

## 2021-10-22 RX ORDER — BENZTROPINE MESYLATE 1 MG/1
1 TABLET ORAL
Status: DISCONTINUED | OUTPATIENT
Start: 2021-10-22 | End: 2021-10-26 | Stop reason: HOSPADM

## 2021-10-22 RX ADMIN — ALPRAZOLAM 0.5 MG: 0.5 TABLET ORAL at 13:53

## 2021-10-22 RX ADMIN — HYDROXYZINE HYDROCHLORIDE 50 MG: 50 TABLET, FILM COATED ORAL at 21:39

## 2021-10-22 RX ADMIN — TRAZODONE HYDROCHLORIDE 50 MG: 50 TABLET ORAL at 21:38

## 2021-10-22 NOTE — ED PROVIDER NOTES
Patient is a 69-year-old male with past medical history significant for ADHD and what he describes as manic depression who presents emergency department for evaluation of apparent jorge. He states that he had taken some prednisone for shingles outbreak recently and felt that this precipitated his symptoms. He was evaluated in the ER 2 days ago but has had worsening symptoms. He states he has not been sleeping much. Denies any suicidal or homicidal ideation however is worried if he makes a bad decision he could hurt somebody. Patient denies any fevers or chills or any pain. Patient does state he uses CBD and marijuana to self medicate for his symptoms but denies any other coingestions. Past Medical History:   Diagnosis Date    ADHD (attention deficit hyperactivity disorder) 5/1/2013    Depression 5/1/2013       No past surgical history on file. No family history on file. Social History     Socioeconomic History    Marital status:      Spouse name: Not on file    Number of children: Not on file    Years of education: Not on file    Highest education level: Not on file   Occupational History    Not on file   Tobacco Use    Smoking status: Passive Smoke Exposure - Never Smoker    Smokeless tobacco: Never Used   Substance and Sexual Activity    Alcohol use: Yes    Drug use: Not on file    Sexual activity: Not on file   Other Topics Concern    Not on file   Social History Narrative    Not on file     Social Determinants of Health     Financial Resource Strain:     Difficulty of Paying Living Expenses:    Food Insecurity:     Worried About Running Out of Food in the Last Year:     [address] in the Last Year:    Transportation Needs:     Lack of Transportation (Medical):      Lack of Transportation (Non-Medical):    Physical Activity:     Days of Exercise per Week:     Minutes of Exercise per Session:    Stress:     Feeling of Stress :    Social Connections:     Frequency of Communication with Friends and Family:     Frequency of Social Gatherings with Friends and Family:     Attends Holiness Services:     Active Member of Clubs or Organizations:     Attends Club or Organization Meetings:     Marital Status:    Intimate Partner Violence:     Fear of Current or Ex-Partner:     Emotionally Abused:     Physically Abused:     Sexually Abused: ALLERGIES: Prednisone    Review of Systems   Constitutional: Negative for fever. HENT: Negative for drooling. Eyes: Negative for photophobia. Respiratory: Negative for shortness of breath. Cardiovascular: Negative for chest pain. Gastrointestinal: Negative for abdominal pain and vomiting. Genitourinary: Negative for dysuria. Musculoskeletal: Negative for back pain. Skin: Negative for rash and wound. Neurological: Negative for seizures and syncope. Psychiatric/Behavioral: Negative for confusion, hallucinations, self-injury and suicidal ideas. The patient is nervous/anxious and is hyperactive. Vitals:    10/22/21 1144   BP: (!) 153/91   Pulse: 94   Resp: 16   Temp: 98.3 °F (36.8 °C)   SpO2: 100%            Physical Exam  Vitals and nursing note reviewed. Constitutional:       Appearance: He is not toxic-appearing or diaphoretic. HENT:      Head: Normocephalic and atraumatic. Right Ear: External ear normal.      Left Ear: External ear normal.      Nose:      Comments: Mask in place  Eyes:      General: No scleral icterus. Cardiovascular:      Rate and Rhythm: Normal rate. Pulses: Normal pulses. Pulmonary:      Effort: Pulmonary effort is normal.   Musculoskeletal:      Cervical back: Neck supple. Right lower leg: No edema. Left lower leg: No edema. Skin:     General: Skin is warm and dry. Neurological:      Mental Status: He is alert and oriented to person, place, and time.    Psychiatric:         Attention and Perception: Attention and perception normal. Mood and Affect: Mood is anxious. Speech: Speech is rapid and pressured. Behavior: Behavior is hyperactive. Behavior is cooperative. Thought Content: Thought content does not include homicidal or suicidal ideation. Cognition and Memory: Cognition normal.          MDM  Number of Diagnoses or Management Options     Amount and/or Complexity of Data Reviewed  Clinical lab tests: reviewed      ED Course as of Oct 22 1535   Fri Oct 22, 2021   1403 EKG obtained at 1341 showing sinus rhythm, rate 68, normal intervals, normal axis, no acute appearing findings. [JE]      ED Course User Index  [JE] Ochoa Aguilera MD       Procedures           4:04 PM  Change of shift. Care of patient signed over to Dr. Rufino Valdez. Bedside handoff complete. Awaiting psych disposition and possible admission.

## 2021-10-22 NOTE — BSMART NOTE
Comprehensive Assessment Form Part 1      Section I - Disposition    Axis I - Unspecified Mood Disorder, ADHD (by hx)   Axis II - Deferred  Axis III - Deferred    The Medical Doctor to Psychiatrist conference was not completed. The Medical Doctor is in agreement with Psychiatrist disposition because of (reason) N/A. The plan is to present for psychiatric admission. The on-call Psychiatrist consulted was Dr. Isaac Hanks. The admitting Psychiatrist will be Dr. Cole Houston. The admitting Diagnosis is Unspecified Mood Disorder. The Payor source is [facility] [place]. [place] Suicide-Scale Rating: Low Risk. Section II - Integrated Summary  Summary:  Patient is a 43-year-old male who came to the ER today with his wife with the following chief complaint:    Triage: Pt arrives ambulatory from home with CC of Vika that he believes was induced by progesterone he was taking to treat shingles? He reports he is only sleeping every other day. He reports a hx of bipolar. He denies SI/HI. He endorses an anti-government sentiment. Patient seen in an ER room, in which he was lying on the bed. He presented as manic, with pressured speech, speaking almost constantly upon evaluator entering the room. Asked why he's in ER, patient stated, \"I'm here to get some choices. I need you to tell me I'm not crazy. \"  He then stated that he had recently been treated for shingles, which led to \"a spiraling manic episode. \"  He stated that this experience is new for him, and that he'd had a history only of ADHD and OCD. (Wife, Franklyn Hammond, later stated that patient has no history of OCD or treatment thereof). He denied previous diagnosis of Bipolar Disorder, but stated \"I sort of made a self-diagnosis after the steroids and talking to a bunch of people. \"  Patient appeared to be having difficulty answering more-specific questions, and stated that he was very tired and could use a nap.   He gave verbal permission to evaluator to speak to Franklyn Hammond separately. Stacy stated that this episode is very surprising and very upsetting for her, becoming tearful at times. \"He's not acting like himself at all. I'm concerned. \"  She provided information on the sequence of events leading up to today's visit. Patient reportedly experienced a rash last Saturday (10/16/21), was advised through \"an aby\" by his doctor that he likely had shingles and was prescribed an antiviral and a steroid. By Monday, patient was saying weird things, with a marked increase in speech. \"He kept getting weirder. \"      Normally introverted, patient reportedly stayed up all night texting and calling people, inviting people to the house, telling them, \"I need an intervention. \"  He reportedly has slept only one night this week, at times \"busting in\" to the bedroom while wife is asleep to talk to her. Patient consulted prescriber on Thursday, reportedly was told that the symptoms likely are due to steroid and to stop taking it. However, patient has remained elevated and manic, behaving unusually. He reportedly walked around the neighborhood all night, talking to strangers, stating that his feet hurt. Last night, he reportedly began making spiritual statements, such as \"I have to do what the [facility] tells me to do. \"  Patient also reportedly has not been adequately attentive to his 3year-old son, patient's primary responsibility as he does not work. Further, patient has hardly been eating, stating that he's doing intermittent fasting which, per Stacy, is new to him and unlike him. Patient reportedly smokes CBD, but adds marijuana, later expressing guilt about it. Admission for inpatient psychiatric treatment appears necessary to stabilize mood and ensure safety. Patient states that he is willing to be admitted \"if there's a consensus that that's what I need to do. \"  He appears to be aware that there's a problem but, while making sense when he speaks, he appears to struggle to control his speech and expresses worry that others will \"think I'm crazy. \"  Patient denies SI/HI, but reportedly told wife that he is worried \"about making a bad decision,\" aware that he's out of control. Wife expresses concern that patient is unable to care for himself currently, making poor decisions, putting himself in danger with manic behavior. She added that patient's father is diagnosed with Bipolar Disorder. Plan is to consult with Dr. Jaquan Lyman regarding medical perspective, then to re-assess patient's willingness to be admitted, capacity to make and maintain that decision. Patient was given Xanax and slept for a couple of hours. He then presented as significantly calmer, speaking normally, but acknowledging need for inpatient treatment to stabilize mood and ensure safety. Wife, Kristi Flores, supports admission. The patient has demonstrated mental capacity to provide informed consent. The information is given by the patient and wife. The Chief Complaint is new onset manic episode with poor sleep. The Precipitant Factors are suspected to be prescribed steroid. Previous Hospitalizations: Denies, none known. The patient has not previously been in restraints. Current Psychiatrist and/or  is N/A. Lethality Assessment:    The potential for suicide noted by the following: not noted. The potential for homicide is not noted. The patient has not been a perpetrator of sexual or physical abuse. There are not pending charges. The patient is felt to be at risk for self harm or harm to others. The attending nurse was advised the patient is at risk for self harm and the patient needs supervision. Section III - Psychosocial  The patient's overall mood and attitude is manic, anxious. Feelings of helplessness and hopelessness are not observed. Generalized anxiety is observed by patient statement, behavior. Panic is not observed. Phobias are not observed.   Obsessive compulsive tendencies are observed by patient statement. Section IV - Mental Status Exam  The patient's appearance is unkempt. The patient's behavior is manic  and is restless. The patient is oriented to time, place, person and situation. The patient's speech is pressured. The patient's mood is anxious and is expansive. The range of affect shows no evidence of impairment. The patient's thought content demonstrates grandiosity. The thought process shows a flight of ideas. The patient's perception demonstrated no changes in the following. The patient's memory is recent. The patient's appetite is decreased and shows signs of weight loss. The patient's sleep has evidence of insomnia. The patient shows little insight. The patient's judgement is psychologically impaired. Section V - Substance Abuse  The patient is using substances. The patient is using cannabis by inhalation for greater than 10 years with last use on unknown. The patient has experienced the following withdrawal symptoms: N/A. Section VI - Living Arrangements  The patient is . The spouses approximate age is 36 and appears to be in good health. The patient lives with a spouse. The patient has one child age 3. The patient does plan to return home upon discharge. The patient does not have legal issues pending. The patient's source of income comes from family. Protestant and cultural practices have not been voiced at this time. The patient's greatest support comes from his wife and this person will be involved with the treatment. The patient has not been in an event described as horrible or outside the realm of ordinary life experience either currently or in the past.  The patient has not been a victim of sexual/physical abuse. Section VII - Other Areas of Clinical Concern  The highest grade achieved is some college with the overall quality of school experience being described as a struggle.   The patient is currently unemployed and speaks [place] as a primary language. The patient has no communication impairments affecting communication. The patient's preference for learning can be described as: can read and write adequately. The patient's hearing is normal.  The patient's vision is normal.      Stevenson Burgos LCSW.

## 2021-10-22 NOTE — BH NOTES
TRANSFER - IN REPORT:    Verbal report received from [facility] on Aj Sibley  being received from ER (unit) for routine progression of care      Report consisted of patients Situation, Background, Assessment and   Recommendations(SBAR). Information from the following report(s) SBAR was reviewed with the receiving nurse. Opportunity for questions and clarification was provided. Assessment completed upon patients arrival to unit and care assumed.

## 2021-10-22 NOTE — BSMART NOTE
Patient accepted by Michelle Macias NP for Dr. Irene Haley, for inpatient treatment in [facility]. Room 730-01. Report: . Stevenson Burgos LCSW.

## 2021-10-22 NOTE — TELEPHONE ENCOUNTER
Patient advised to go to ED by Nurse Triage, did not re-triage as pt has already been given an ED disposition

## 2021-10-22 NOTE — ED NOTES
2:58 PM  Change of shift. Care of patient taken over from Dr Quinton Aggarwal; H&P reviewed, bedside handoff complete. Pt has been medically cleared to be evaluated by Psychiatry.   Awaiting Psychiatric admission;

## 2021-10-23 PROCEDURE — 74011250637 HC RX REV CODE- 250/637: Performed by: NURSE PRACTITIONER

## 2021-10-23 PROCEDURE — 65220000003 HC RM SEMIPRIVATE PSYCH

## 2021-10-23 RX ADMIN — HYDROXYZINE HYDROCHLORIDE 50 MG: 50 TABLET, FILM COATED ORAL at 19:37

## 2021-10-23 RX ADMIN — OLANZAPINE 7.5 MG: 5 TABLET, FILM COATED ORAL at 20:24

## 2021-10-23 RX ADMIN — TRAZODONE HYDROCHLORIDE 50 MG: 50 TABLET ORAL at 20:24

## 2021-10-23 NOTE — BH NOTES
PSYCHOSOCIAL ASSESSMENT  :Patient identifying info:   Reyna Irwin is a 42 y.o., male admitted 10/22/2021 12:31 PM     Presenting problem and precipitating factors:   Pt presented to the ED, accompanied by his wife, Stacy, with an onset of severe jorge, endorsing an anti-government sentiment. He denies SI/HI. His wife reports he has not been acting like himself since Monday. Both believe his jorge was triggered by antiviral medication and steroids given to him for shingles, last Saturday (10/16/21). Mental status assessment: alert, oriented, thoughts are logical and goal-directed, hyperverbal, good eye contact. Denies SI/HI/AVH. Strengths:  Stable housing  Family support  Moderate insight    Collateral information: JACEK signed  WifeMani: [phone]    Current psychiatric /substance abuse providers and contact info:   None reported    Previous psychiatric/substance abuse providers and response to treatment:   None reported    Family history of mental illness or substance abuse:   Father: bipolar    Substance abuse history:  Cannabis     Social History     Tobacco Use    Smoking status: Passive Smoke Exposure - Never Smoker    Smokeless tobacco: Never Used   Substance Use Topics    Alcohol use: Yes       History of biomedical complications associated with substance abuse :  None reported    Patient's current acceptance of treatment or motivation for change:  Voluntary    Family constellation:   Wife and one 4yo son    Is significant other involved?    Yes    Describe support system:   WifeMani:     Describe living arrangements and home environment:  Lives with wife and child    Health issues:   Hospital Problems  Date Reviewed: 10/21/2021        Codes Class Noted POA    Unspec[facility]affective) [facility] ICD-10-CM: O38  ICD-9-CM: 296.90  10/22/2021 Unknown              Trauma history:   None reported    Legal issues:   None reported    History of  service:   None reported    Financial status:   Pt is stay at home father  Wife is employed    Zoroastrianism/cultural factors:     Education/work history:   College    Have you been licensed as a health care professional (current or ):   Pt reports no    Leisure and recreation preferences:     Describe coping skills:  Limited    Soto Longoria  10/23/2021

## 2021-10-23 NOTE — BH NOTES
Pt request that treatment team communicate with his wife regarding his treatment plan. Treatment team made aware. Pt states the last pharmacy he used was [facility] out patient pharmacy with is currently closed. Pt declines his scheduled Zyprexa 7.5 mg po medication stating he will take it later this evening.

## 2021-10-23 NOTE — PROGRESS NOTES
Problem: Manic Behavior (Adult/Pediatric)  Goal: *STG: Remains safe in hospital  Outcome: Progressing Towards Goal    Visible on the unit. Labile mood, denies SI. Compliant with meals and medications.

## 2021-10-23 NOTE — BH NOTES
Behavioral Health Interdisciplinary Rounds     Patient Name: Tanna Lackey  Age: 42 y.o. Room/Bed:  730/01  Primary Diagnosis: <principal problem not specified>   Admission Status: Voluntary     Readmission within 30 days: no  Power of  in place: no  Patient requires a blocked bed: no          Reason for blocked bed: N/A    VTE Prophylaxis: Not indicated    Mobility needs/Fall risk: no  Flu Vaccine : no   Nutritional Plan: no  Consults:          Labs/Testing due today?: no    Sleep hours:        Participation in Care/Groups:  no  Medication Compliant?: Yes  PRNS (last 24 hours): Antianxiety and Sleep Aid    Restraints (last 24 hours):  no     CIWA (range last 24 hours):     COWS (range last 24 hours):      Alcohol screening (AUDIT) completed -   AUDIT Score: 2     If applicable, date SBIRT discussed in treatment team AND documented:   AUDIT Screen Score: AUDIT Score: 2      Document Brief Intervention (corresponds directly with the 5 A's, Ask, Advise, Assess, Assist, and Arrange): At- Risk Patients (Score 7-15 for women; 8-15 for men)  Discuss concern patient is drinking at unhealthy levels known to increase risk of alcohol-related health problems. Is Patient ready to commit to change? If No:   Encourage reflection   Discuss short term and long term health risks of consuming alcohol   Barriers to change   Reaffirm willingness to help / Educational materials provided  If Yes:   Set goal  Oh BiBi provided    Harmful use or Dependence (Score 16 or greater)   Discuss short term and long term health risks of consuming alcohol   Recommendations   Negotiate drinking goal   Recommend addiction specialist/center   Arrange follow-up appointments.     Tobacco - patient is a smoker: Have You Used Tobacco in the Past 30 Days: No  Illegal Drugs use: Have You Used Any Illegal Substances Over the Past 12 Months: No    24 hour chart check complete: yes     Patient goal(s) for today: attend groups  Treatment team focus/goals: treatment goals, med mgmt  Progress note   Pt reports sleeping well but feeling \"trapped\" here at the hospital.  He denies SI/HI/AVH. He confirms his thinking was \"not right\" and states that is why he was voluntary during admission. He states he felt he could not trust his thoughts in the ED, so listened to those around him and volunteered. He denies smoking cannabis stating he smokes CBD/hemp he buys at a dispensary - low THC. He is agreeable to counseling and is requesting a psychologist for testing.     LOS:  1  Expected LOS:     Financial concerns/prescription coverage:    Family contact: JACEK signed  Wife, Dagoberto Teague: [phone]    Family requesting physician contact today:    Discharge plan: return home  Access to weapons :         Outpatient provider(s):   PCP, Cristina Yi,   Patient's preferred phone number for follow up call :   Patient's preferred e-mail address :  Participating treatment team members: AGUSTINA Fuller; Kelly Segura NP; Anette Ray RN

## 2021-10-23 NOTE — BH NOTES
2138: PRN Medication Documentation    Specific patient behavior that led to need for PRN medication: Pt requested medication for c/o \"anxiety\" and to promote sleep.    Staff interventions attempted prior to PRN being given: distraction, listening, coping skills   PRN medication given: atarax, trazodone

## 2021-10-23 NOTE — PROGRESS NOTES
Problem: Falls - Risk of  Goal: *Absence of Falls  Description: Document Grace Minium Fall Risk and appropriate interventions in the flowsheet. Outcome: Progressing Towards Goal  Note: Fall Risk Interventions:     Medication Interventions: Teach patient to arise slowly    Pt appears to be sleeping, no distress noted.

## 2021-10-23 NOTE — ED NOTES
TRANSFER - OUT REPORT:    Verbal report given to Carol Kong RN(name) on Rhett Florez  being transferred to (unit) for routine progression of care       Report consisted of patients Situation, Background, Assessment and   Recommendations(SBAR). Information from the following report(s) SBAR was reviewed with the receiving nurse. Lines:       Opportunity for questions and clarification was provided.       Patient transported with:   Registered Nurse

## 2021-10-23 NOTE — BH NOTES
ADMISSION NOTE    Patient admitted under the care of KIRK Recinos for unspecified mood disorder. Admission Status: Voluntary     Reason for Admission: Bizarre behavior     Pt.'s Condition on Arrival: Cooperative, anxious mood     Pt.'s Statements/Questions/Concerns: \"I have been stressed out. \"               Safety: Pt denies SI    Primary Nurse Levi Maria RN and Mohini Stokes RN performed a dual skin assessment on this patient No impairment noted  Fer score is 23    Multiple scabs and bruising noted throughout pt's body.

## 2021-10-23 NOTE — H&P
INITIAL PSYCHIATRIC INTERVIEW:    CHIEF COMPLAINT: \"I've been better, but I feel like I'm doing way better than I was last night. \"     HISTORY OF PRESENTING COMPLAINT:  Tanna Lackey is a 42 y.o. WHITE/NON- male who is currently admitted to the psychiatric floor at [facility]. Reason for admission is a manic episode starting on Monday. Pt and his wife belief manic episode was triggered by steroids given to him 10/16/21 for shingles. During this episode he was not sleeping at all most nights over the last week, inviting people to the house he normally wouldn't, waking wife in the middle of the night, becoming hyperverbal, walking the neighborhood at night, talking to strangers, making spiritual statements like \"I have to do what the holy spirit tells me to do. \"   Andrew Dunaway states he is feeling better today than yesterday. He slept 5 hours last night. He states \"I feel like this is a good place for me if I'm actually struggling with jorge. \" He reports he was on an \"every other night sleeping schedule. \" He reports he was feeling upset last night because he felt \"trapped\" when he wanted to leave. He states he feels like \"I accidentally went to intermediate instead of the hospital.\" He denies current thoughts of hurting himself or others. PAST PSYCHIATRIC, PSYCHOSOCIAL HISTORY and SUBSTANCE ABUSE HISTORY:  Psychiatric history includes ADHD and depression. This is his first psychiatric hospitalization. He has seen psychiatrists in the past for ADHD treatment. He denies ever having manic episodes before but he states \"I recognize the pattern of ups and downs, having energy, being depressed. \" Family psychiatric history includes father with bipolar disorder. He currently takes Zoloft 50mg prescribed by PCP Elle Solitario. Past psychiatric medications include Wellbutrin, Vyvanse, No hx of auditory or visual hallucinations. He is currently not working, but staying home with his son.  Highest level of education is some college. He is  with a 3year old son. He drinks alcohol socially. He states he doesn't smoke THC but \"uses CBD\" and sometimes vapes. No legal hx, no hx of violence towards others. PAST MEDICAL HISTORY:  Please see H&P for details. Past Medical History:   Diagnosis Date    ADHD (attention deficit hyperactivity disorder) 5/1/2013    Depression 5/1/2013     Prior to Admission medications    Medication Sig Start Date End Date Taking? Authorizing Provider   sertraline (ZOLOFT) 50 mg tablet Take 1 Tablet by mouth daily. 9/17/21   Rigoberto Mehta, DO   fluticasone (FLONASE) 50 mcg/actuation nasal spray 2 Sprays by Both Nostrils route daily for 90 days. Long term, three month supply  Indications: ALLERGIC RHINITIS 2/24/17 5/25/17  Odean December, NP     Vitals:    10/22/21 1144 10/22/21 1935 10/22/21 2027   BP: (!) 153/91 114/80 (!) 132/91   Pulse: 94 65 71   Resp: 16 14 16   Temp: 98.3 °F (36.8 °C) 98.4 °F (36.9 °C) 97.4 °F (36.3 °C)   SpO2: 100% 99% 98%     Lab Results   Component Value Date/Time    WBC 10.8 10/22/2021 01:46 PM    HGB 14.5 10/22/2021 01:46 PM    HCT 42.8 10/22/2021 01:46 PM    PLATELET 151 54/15/0904 01:46 PM    MCV 82.5 10/22/2021 01:46 PM     Lab Results   Component Value Date/Time    Sodium 136 10/22/2021 01:46 PM    Potassium 3.7 10/22/2021 01:46 PM    Chloride 103 10/22/2021 01:46 PM    CO2 29 10/22/2021 01:46 PM    Anion gap 4 (L) 10/22/2021 01:46 PM    Glucose 91 10/22/2021 01:46 PM    BUN 20 10/22/2021 01:46 PM    Creatinine 0.91 10/22/2021 01:46 PM    BUN/Creatinine ratio 22 (H) 10/22/2021 01:46 PM    GFR est AA >60 10/22/2021 01:46 PM    GFR est non-AA >60 10/22/2021 01:46 PM    Calcium 9.0 10/22/2021 01:46 PM    Bilirubin, total 0.8 10/22/2021 01:46 PM    Alk.  phosphatase 67 10/22/2021 01:46 PM    Protein, total 7.9 10/22/2021 01:46 PM    Albumin 3.8 10/22/2021 01:46 PM    Globulin 4.1 (H) 10/22/2021 01:46 PM    A-G Ratio 0.9 (L) 10/22/2021 01:46 PM    ALT (SGPT) 33 10/22/2021 01:46 PM    AST (SGOT) 24 10/22/2021 01:46 PM     No results found for: VALF2, VALAC, VALP, VALPR, DS6, CRBAM, CRBAMP, CARB2, XCRBAM  No results found for: LITHM  RADIOLOGY REPORTS:(reviewed/updated 10/23/2021)  No results found. No results found for: Kathrin Begin [id number], [id number], [id number], PREGU, POCHCG, MHCGN, HCGQR, THCGA1, SHCG, HCGN, HCGSERUM, HCGURQLPOC       MENTAL STATUS EXAM:  General appearance:  Poorly groomed, psychomotor activity is WNL  Eye contact: moderate eye contact  Speech: slightly hyperverbal  Affect : appropriate  Mood: \"better\"  Thought Process: Logical, goal directed  Perception: Denies AH or VH. Thought Content: denies SI or Plan  Insight: Partial  Judgement: Fair  Cognition: Intact grossly. ASSESSMENT AND PLAN:  [place] Silence meets criteria for a diagnosis of unspecified affective disorder and ADHD. -Begin scheduled Zyprexa 7.5mg QHS, as pt tolerated 5mg dose last night well and it was moderately effective; increase next week if needed. Continue inpatient stay for the safety and optimum care of the patient   Routine labs to be ordered as needed. Psychotropic medications will be ordered and adjusted as needed. Patients status is Voluntary  Supportive, milieu and group therapy. Continue rest of the medications as needed. Strengths include ability to seek help and family support  Estimated length of stay is 5-7 days.

## 2021-10-24 LAB
ATRIAL RATE: 68 BPM
CALCULATED P AXIS, ECG09: 50 DEGREES
CALCULATED R AXIS, ECG10: -9 DEGREES
CALCULATED T AXIS, ECG11: 30 DEGREES
DIAGNOSIS, 93000: NORMAL
P-R INTERVAL, ECG05: 136 MS
Q-T INTERVAL, ECG07: 404 MS
QRS DURATION, ECG06: 100 MS
QTC CALCULATION (BEZET), ECG08: 429 MS
VENTRICULAR RATE, ECG03: 68 BPM

## 2021-10-24 PROCEDURE — 74011250637 HC RX REV CODE- 250/637: Performed by: NURSE PRACTITIONER

## 2021-10-24 PROCEDURE — 65220000003 HC RM SEMIPRIVATE PSYCH

## 2021-10-24 RX ADMIN — OLANZAPINE 7.5 MG: 5 TABLET, FILM COATED ORAL at 17:50

## 2021-10-24 RX ADMIN — TRAZODONE HYDROCHLORIDE 50 MG: 50 TABLET ORAL at 21:06

## 2021-10-24 RX ADMIN — HYDROXYZINE HYDROCHLORIDE 50 MG: 50 TABLET, FILM COATED ORAL at 12:01

## 2021-10-24 RX ADMIN — ACETAMINOPHEN 650 MG: 325 TABLET ORAL at 21:06

## 2021-10-24 NOTE — BH NOTES
TRANSFER - OUT REPORT:    Verbal report given to [facility] RN(name) on Lázaro Felt  being transferred to [facility] (unit) for routine progression of care       Report consisted of patients Situation, Background, Assessment and   Recommendations(SBAR). Information from the following report(s) SBAR was reviewed with the receiving nurse. Lines:       Opportunity for questions and clarification was provided.       Patient transported with:   Registered Nurse

## 2021-10-24 NOTE — BH NOTES
Weekend Progress Note:    Chief Complaint: \"I'm better than yesterday. \"     Length of Stay: 2 Days    Interval History: Sharda Villalta states he feels improved. He states \"I feel more OK with where I'm at. \" He is more understanding that he needs help. He states he is tired and feels restless. He misses his family. He is working on his coping mechanisms. He continues to perseverate about \"giving away my freedom. \" He states his mood has been labile recently. He denies thoughts of wanting to hurt himself, no thoughts of suicide/plan/intent. He is playing out scenarios of wanting to leave and states \"any violence would be self-defense, and I have the right to defend myself. \" He states \"I didn't mean to put myself in a place where I couldn't leave. \" He slept for 12 hours last night and tolerated his 7.5mg of Zyprexa well. He has no other concerns about mental health at this time. Past Medical History:  Past Medical History:   Diagnosis Date    ADHD (attention deficit hyperactivity disorder) 5/1/2013    Depression 5/1/2013         Labs:  Lab Results   Component Value Date/Time    WBC 10.8 10/22/2021 01:46 PM    HGB 14.5 10/22/2021 01:46 PM    HCT 42.8 10/22/2021 01:46 PM    PLATELET 194 88/53/8203 01:46 PM    MCV 82.5 10/22/2021 01:46 PM      Lab Results   Component Value Date/Time    Sodium 136 10/22/2021 01:46 PM    Potassium 3.7 10/22/2021 01:46 PM    Chloride 103 10/22/2021 01:46 PM    CO2 29 10/22/2021 01:46 PM    Anion gap 4 (L) 10/22/2021 01:46 PM    Glucose 91 10/22/2021 01:46 PM    BUN 20 10/22/2021 01:46 PM    Creatinine 0.91 10/22/2021 01:46 PM    BUN/Creatinine ratio 22 (H) 10/22/2021 01:46 PM    GFR est AA >60 10/22/2021 01:46 PM    GFR est non-AA >60 10/22/2021 01:46 PM    Calcium 9.0 10/22/2021 01:46 PM    Bilirubin, total 0.8 10/22/2021 01:46 PM    Alk.  phosphatase 67 10/22/2021 01:46 PM    Protein, total 7.9 10/22/2021 01:46 PM    Albumin 3.8 10/22/2021 01:46 PM    Globulin 4.1 (H) 10/22/2021 01:46 PM A-G Ratio 0.9 (L) 10/22/2021 01:46 PM    ALT (SGPT) 33 10/22/2021 01:46 PM      Vitals:    10/23/21 1102 10/23/21 1252 10/23/21 1603 10/24/21 0938   BP: 122/83  109/75 129/79   Pulse: 97  96 89   Resp: 16  16 16   Temp: 98.9 °F (37.2 °C)  98.6 °F (37 °C) 98.1 °F (36.7 °C)   SpO2: 98%   97%   Weight:  81.6 kg (180 lb)  80 kg (176 lb 4.8 oz)   Height:  5' 7\" (1.702 m)           Current Facility-Administered Medications   Medication Dose Route Frequency Provider Last Rate Last Admin    OLANZapine (ZyPREXA) tablet 7.5 mg  7.5 mg Oral QPM Cuca Canales NP   7.5 mg at 10/23/21 2024    haloperidol lactate (HALDOL) injection 5 mg  5 mg IntraMUSCular Q6H PRN Flori Arevalo NP        benztropine (COGENTIN) tablet 1 mg  1 mg Oral BID PRN Flori Arevalo NP        diphenhydrAMINE (BENADRYL) injection 50 mg  50 mg IntraMUSCular BID PRN Flori Arevalo NP        hydrOXYzine HCL (ATARAX) tablet 50 mg  50 mg Oral TID PRN Flori Arevalo NP   50 mg at 10/24/21 1201    LORazepam (ATIVAN) injection 1 mg  1 mg IntraMUSCular Q4H PRN Flori Arevalo NP        traZODone (DESYREL) tablet 50 mg  50 mg Oral QHS PRN Flori Arevalo NP   50 mg at 10/23/21 2024    acetaminophen (TYLENOL) tablet 650 mg  650 mg Oral Q4H PRN Flori Arevalo NP        magnesium hydroxide (MILK OF MAGNESIA) 400 mg/5 mL oral suspension 30 mL  30 mL Oral DAILY PRN Flori Arevalo NP           Mental Status Exam:  General appearance:  Poorly groomed, psychomotor activity is WNL  Eye contact: moderate eye contact  Speech: slightly hyperverbal  Affect : appropriate  Mood: \"better\"  Thought Process: Logical, goal directed  Perception: Denies AH or VH. Thought Content: denies SI or Plan  Insight: Partial  Judgement: Fair  Cognition: Intact grossly. Physical Exam:  Body habitus: Body mass index is 27.61 kg/m².   Musculoskeletal system: normal gait  Tremor - neg  Cog wheeling - neg    Assessment and Plan:  Lashaun Olivas meets criteria for a diagnosis of unspecified affective disorder and ADHD. Continue the medication regimen as prescribed  Disposition planning to continue. A coordinated, multidisplinary treatment team round was conducted with the patient, nurses, pharmcist,  and writer present. Discussions held with , and/or with family members; Complete current electronic health record for patient was reviewed in full including consultant notes, ancillary staff notes, nurses and tech notes, labs and vitals. I certify that this patients inpatient psychiatric hospital services furnished since the previous certification were, and continue to be, required for treatment that could reasonably be expected to improve the patient's condition, or for diagnostic study, and that the patient continues to need, on a daily basis, active treatment furnished directly by or requiring the supervision of inpatient psychiatric facility personnel. In addition, the hospital records show that services furnished were intensive treatment services, admission or related services, or equivalent services.

## 2021-10-24 NOTE — BH NOTES
PRN Medication Documentation  1937  Specific patient behavior that led to need for PRN medication: agitation, racing thoughts  Staff interventions attempted prior to PRN being given: education  PRN medication given: Atarax  Patient response/effectiveness of PRN medication: will continue to monitor      PRN Medication Documentation  2024  Specific patient behavior that led to need for PRN medication: sleeplessnesss  Staff interventions attempted prior to PRN being given: none  PRN medication given: Trazodone  Patient response/effectiveness of PRN medication: will continue to monitor

## 2021-10-24 NOTE — PROGRESS NOTES
Problem: Manic Behavior (Adult/Pediatric)  Goal: *STG: Participates in treatment plan  Outcome: Progressing Towards Goal  Goal: *STG: Demonstrates decrease in delusional thinking  Outcome: Progressing Towards Goal  Goal: *STG: Maintains appropriate boundaries  Outcome: Progressing Towards Goal  Goal: *STG: Seeks staff when feelings of anxiety and fear arise  Outcome: Progressing Towards Goal    Mood is anxious, affect constricted. Engages with others allowing for conversational exchanges. Denies si/hi. Medication compliant. Patient is cooperative. Requests medication for anxiety, \"feels like I'm going a bit stir crazy\" typically walks couple of miles when at home, and unable to do that here. Patient states that he will purposely speak without filters so that we \"the professionals\" can assess his current frame of mind. He hasn't been making the best decisions prior to coming here and people were getting scared.

## 2021-10-24 NOTE — BH NOTES
1203- PRN Medication Documentation    Specific patient behavior that led to need for PRN medication: pt requests anti anxiety medication for anxiety   Staff interventions attempted prior to PRN being given: education, coping skills, therapeutic communication   PRN medication given: po 50 mg Atarax  Patient response/effectiveness of PRN medication: pt is alert and oriented ambulating on the unit for exercise to relieve anxiety.

## 2021-10-24 NOTE — PROGRESS NOTES
Problem: Falls - Risk of  Goal: *Absence of Falls  Description: Document Law Riley Fall Risk and appropriate interventions in the flowsheet.   10/24/2021 1559 by Eric To RN  Outcome: Progressing Towards Goal  Note: Fall Risk Interventions:            Medication Interventions: Teach patient to arise slowly                10/24/2021 1555 by Eric To RN  Outcome: Progressing Towards Goal  Note: Fall Risk Interventions:            Medication Interventions: Teach patient to arise slowly                   Problem: Patient Education: Go to Patient Education Activity  Goal: Patient/Family Education  10/24/2021 1559 by Eric To RN  Outcome: Progressing Towards Goal  10/24/2021 1555 by Eric To RN  Outcome: Progressing Towards Goal

## 2021-10-24 NOTE — BH NOTES
GROUP THERAPY PROGRESS NOTE     Patient is participating in psychotherapy group. Group time: 60 minutes     Personal goal for participation: identify signs and symptoms of stress and appropriate coping skills to use to minimize stress effect     Goal orientation: Personal     Group therapy participation: active     Therapeutic interventions reviewed and discussed: Group members were given opportunity to engaged in conversation about they mental health challenges, strengths and recovery plan. Members were guided through assessing triggers, warning signs (specific thoughts, feelings, behaviors, body sensations, etc.) that a crisis may be developing, using their current hospitalization and recent crisis event as the case study. Members were supported to assess their current self-care practices as well as generate a list of internal coping strategies they can do independently to redirect their thinking and deescalate impending crisis. Members were provided with resources for self-care and coping to continue development in these areas. Impression of participation: Pt actively engaged in group discussion. Pt shared that he needs to work on impulsivity when in crisis and recognized warning signs of impulsivity, hyperverbalism, and restlessness. Pt shared that he hopes to keep a journal to track his day to day habits to be able to reflect on things that impact his mood.      Chani Castaneda, Social Work Case Management

## 2021-10-24 NOTE — PROGRESS NOTES
Problem: Falls - Risk of  Goal: *Absence of Falls  Description: Document Alejandro Kyle Fall Risk and appropriate interventions in the flowsheet.   Outcome: Progressing Towards Goal  Note: Fall Risk Interventions:       Medication Interventions: Teach patient to arise slowly         Problem: Patient Education: Go to Patient Education Activity  Goal: Patient/Family Education  Outcome: Progressing Towards Goal     Problem: Manic Behavior (Adult/Pediatric)  Goal: *STG: Remains safe in hospital  Outcome: Progressing Towards Goal

## 2021-10-24 NOTE — PROGRESS NOTES
Problem: Falls - Risk of  Goal: *Absence of Falls  Description: Document Alejandro Kyle Fall Risk and appropriate interventions in the flowsheet. Outcome: Progressing Towards Goal  Note: Fall Risk Interventions:     Medication Interventions: Teach patient to arise slowly    Pt appears to be sleeping, no distress noted.

## 2021-10-24 NOTE — BH NOTES
TRANSFER - IN REPORT:    Verbal report received from Ronni Celeste RN(name) on Rhett Florez  being received from Acute (unit) for routine progression of care      Report consisted of patients Situation, Background, Assessment and   Recommendations(SBAR). Information from the following report(s) SBAR was reviewed with the receiving nurse. Opportunity for questions and clarification was provided. Assessment completed upon patients arrival to unit and care assumed.

## 2021-10-25 PROCEDURE — 74011250637 HC RX REV CODE- 250/637: Performed by: NURSE PRACTITIONER

## 2021-10-25 PROCEDURE — 65220000003 HC RM SEMIPRIVATE PSYCH

## 2021-10-25 PROCEDURE — 74011250637 HC RX REV CODE- 250/637: Performed by: PSYCHIATRY & NEUROLOGY

## 2021-10-25 RX ORDER — OLANZAPINE 5 MG/1
10 TABLET ORAL EVERY EVENING
Status: DISCONTINUED | OUTPATIENT
Start: 2021-10-25 | End: 2021-10-26 | Stop reason: HOSPADM

## 2021-10-25 RX ADMIN — TRAZODONE HYDROCHLORIDE 50 MG: 50 TABLET ORAL at 21:20

## 2021-10-25 RX ADMIN — OLANZAPINE 10 MG: 5 TABLET, FILM COATED ORAL at 17:20

## 2021-10-25 RX ADMIN — ACETAMINOPHEN 650 MG: 325 TABLET ORAL at 08:21

## 2021-10-25 RX ADMIN — HYDROXYZINE HYDROCHLORIDE 50 MG: 50 TABLET, FILM COATED ORAL at 11:26

## 2021-10-25 NOTE — BH NOTES
GROUP THERAPY PROGRESS NOTE    Patient is participating in self-care group. Group time: 45 minutes    Personal goal for participation: Complete safety plans    Goal orientation: Personal    Group therapy participation: active     Therapeutic interventions reviewed and discussed: Members developed an understanding of the benefits of safety planning as a manner of self-care. Members were guided through filling out safety plans. Members were assisted with support information including, crisis numbers and outpatient resources. Impression of participation: Pt was alert, oriented, and pleasant. Pt talked about not thinking he needed a safety plan due to him not being suicidal.  reinforced the importance of being able to identify his triggers and having a plan to resort to when he is in crisis. Pt expressed that if a safety plan is required for him to be discharged, then he would do it. Pt  Wrote down 7 different ways he can make his environment safer.        [address] [employer]C[facility] HospitalCDC [facility], AGUSTINA Intern  Nikolas BERRIOS, [facility]

## 2021-10-25 NOTE — BH NOTES
Progress Note:    Chief Complaint:    I feel better. Length of Stay: 3 Days    Interval History:   Chetan Orozco reports feeling \"better\" today. Says he slept well last night and his mood remains \"fair\". He feels that his mind is \"calming down\". Speech output is still elevated and he can struggle to stop himself from speaking. Calm and pleasant during the interview. He has been calm and interactive in the milieu. No episodes of aggression or agitation are noted. At the present time the patient Augusta Dumont remains compliant with taking medications. Denies any adverse events from taking them and feels they have been beneficial.        Past Medical History:  Past Medical History:   Diagnosis Date    ADHD (attention deficit hyperactivity disorder) 5/1/2013    Depression 5/1/2013         Labs:  Lab Results   Component Value Date/Time    WBC 10.8 10/22/2021 01:46 PM    HGB 14.5 10/22/2021 01:46 PM    HCT 42.8 10/22/2021 01:46 PM    PLATELET 450 13/51/8344 01:46 PM    MCV 82.5 10/22/2021 01:46 PM      Lab Results   Component Value Date/Time    Sodium 136 10/22/2021 01:46 PM    Potassium 3.7 10/22/2021 01:46 PM    Chloride 103 10/22/2021 01:46 PM    CO2 29 10/22/2021 01:46 PM    Anion gap 4 (L) 10/22/2021 01:46 PM    Glucose 91 10/22/2021 01:46 PM    BUN 20 10/22/2021 01:46 PM    Creatinine 0.91 10/22/2021 01:46 PM    BUN/Creatinine ratio 22 (H) 10/22/2021 01:46 PM    GFR est AA >60 10/22/2021 01:46 PM    GFR est non-AA >60 10/22/2021 01:46 PM    Calcium 9.0 10/22/2021 01:46 PM    Bilirubin, total 0.8 10/22/2021 01:46 PM    Alk.  phosphatase 67 10/22/2021 01:46 PM    Protein, total 7.9 10/22/2021 01:46 PM    Albumin 3.8 10/22/2021 01:46 PM    Globulin 4.1 (H) 10/22/2021 01:46 PM    A-G Ratio 0.9 (L) 10/22/2021 01:46 PM    ALT (SGPT) 33 10/22/2021 01:46 PM      Vitals:    10/24/21 0938 10/24/21 1600 10/24/21 2015 10/25/21 0813   BP: 129/79 106/75 113/83 127/88   Pulse: 89 83 83 74   Resp: 16 16 16 16   Temp: 98.1 °F (36.7 °C) 98.4 °F (36.9 °C) 98 °F (36.7 °C) 97.8 °F (36.6 °C)   SpO2: 97% 97% 97% 98%   Weight: 80 kg (176 lb 4.8 oz)      Height:             Current Facility-Administered Medications   Medication Dose Route Frequency Provider Last Rate Last Admin    OLANZapine (ZyPREXA) tablet 7.5 mg  7.5 mg Oral QPM Cuca Canales NP   7.5 mg at 10/24/21 1750    haloperidol lactate (HALDOL) injection 5 mg  5 mg IntraMUSCular Q6H PRN Ibeth Arevaloanie A, NP        benztropine (COGENTIN) tablet 1 mg  1 mg Oral BID PRN Flori Arevalo, NP        diphenhydrAMINE (BENADRYL) injection 50 mg  50 mg IntraMUSCular BID PRN Flori Arevalo A, NP        hydrOXYzine HCL (ATARAX) tablet 50 mg  50 mg Oral TID PRN Urszula Arevaloe A, NP   50 mg at 10/24/21 1201    LORazepam (ATIVAN) injection 1 mg  1 mg IntraMUSCular Q4H PRN Flori Arevalo, NP        traZODone (DESYREL) tablet 50 mg  50 mg Oral QHS PRN Urszula Arevaloe A, NP   50 mg at 10/24/21 2106    acetaminophen (TYLENOL) tablet 650 mg  650 mg Oral Q4H PRN Urszula Arevaloe A, NP   650 mg at 10/25/21 7280    magnesium hydroxide (MILK OF MAGNESIA) 400 mg/5 mL oral suspension 30 mL  30 mL Oral DAILY PRN Vidyadenise Houston A, NP           Mental Status Exam:  General appearance:  Poorly groomed, psychomotor activity is WNL  Eye contact: moderate eye contact  Speech: slightly hyperverbal  Affect : appropriate  Mood: \"better\"  Thought Process: Logical, goal directed  Perception: Denies AH or VH. Thought Content: denies SI or Plan  Insight: Partial  Judgement: Fair  Cognition: Intact grossly. Physical Exam:  Body habitus: Body mass index is 27.61 kg/m². Musculoskeletal system: normal gait  Tremor - neg  Cog wheeling - neg    Assessment and Plan:  Hoa Lorenzo meets criteria for a diagnosis of  Bipolar Disorder, unspecified. R/O Bipolar 1 Disorder. ADHD. Increase Zyprexa 10mg QHS  Continue the medication regimen as prescribed  Disposition planning to continue. A coordinated, multidisplinary treatment team round was conducted with the patient, nurses, pharmcist,  and writer present. Discussions held with , and/or with family members; Complete current electronic health record for patient was reviewed in full including consultant notes, ancillary staff notes, nurses and tech notes, labs and vitals. I certify that this patients inpatient psychiatric hospital services furnished since the previous certification were, and continue to be, required for treatment that could reasonably be expected to improve the patient's condition, or for diagnostic study, and that the patient continues to need, on a daily basis, active treatment furnished directly by or requiring the supervision of inpatient psychiatric facility personnel. In addition, the hospital records show that services furnished were intensive treatment services, admission or related services, or equivalent services.

## 2021-10-25 NOTE — INTERDISCIPLINARY ROUNDS
Behavioral Health Interdisciplinary Rounds     Patient Name: Alek Baird  Age: 42 y.o. Room/Bed:  733/  Primary Diagnosis: <principal problem not specified>   Admission Status: Voluntary     Readmission within 30 days: no  Power of  in place: no  Patient requires a blocked bed: no            VTE Prophylaxis: Not indicated    Mobility needs/Fall risk: no  Flu Vaccine : no   Nutritional Plan: no  Consults: none         Labs/Testing due today?: no    Sleep hours:  Slept well last night        Participation in Care/Groups:  yes  Medication Compliant?: Yes  PRNS (last 24 hours): Antianxiety, Sleep Aid and Pain    Restraints (last 24 hours):  no     CIWA (range last 24 hours):     COWS (range last 24 hours):      Alcohol screening (AUDIT) completed -   AUDIT Score: 2     If applicable, date SBIRT discussed in treatment team AND documented:   AUDIT Screen Score: AUDIT Score: 2    Tobacco - patient is a smoker: Have You Used Tobacco in the Past 30 Days: No  Illegal Drugs use: Have You Used Any Illegal Substances Over the Past 12 Months: No    24 hour chart check complete: yes     Patient goal(s) for today:   Treatment team focus/goals: Plan to set up his discharge. Plan to increase his medications  Progress note :He discussed his feeling he has been depressed since he was a teenager. He has been doing well on the unit. Denies SI.      LOS:  3  Expected LOS: TBD     Financial concerns/prescription coverage: Medical [facility]   Family contact: : JACEK signed  WifeAamir Congress: [phone]    Family requesting physician contact today:  Discharge plan: he will return home when ready for discharge.    Access to weapons :  no     Outpatient provider(s): TBD   Patient's preferred phone number for follow up call :   Patient's preferred e-mail address :  Participating treatment team members: Laek BairdZuleika osborn Dr. - Sada Michael RN

## 2021-10-25 NOTE — BH NOTES
GROUP THERAPY PROGRESS NOTE    Patient is participating in psychotherapy group. Group time: 60 minutes    Personal goal for participation: to develop an understanding of different types of social support, benefits of social support and how we use our supports. Goal orientation: Personal    Group therapy participation: Active     Therapeutic interventions reviewed and discussed:  Group members were given opportunity to engage in conversation about social supports. Members were supported to assess their current social supports and where support may be lacking. The group members engaged in activity to gain insight about boundaries that may prevent adequate support. Members were provided with psychoeducational worksheets. Impression of participation:  Pt was present and engaged in group discussion. Pt shared insight about his social support system. Pts mood was euthymic and congruent with his appearance. Pt was calm and cooperative.        Angel Richard, AGUSTINA, [facility]-A

## 2021-10-25 NOTE — PROGRESS NOTES
Problem: Manic Behavior (Adult/Pediatric)  Goal: *STG: Participates in treatment plan  Outcome: Progressing Towards Goal  Note: Out on unit brighter and mood is stable. Hopeful. Denies SI, thoughts organized, logical and goal directed. Discussed admission event and diagnosis. Pt verbalized understanding however would benefit from more education and outpatient care. Daily goal is to discuss dc.  Staff focus is on offering coping skills/medication education  Goal: *STG: Demonstrates decrease in delusional thinking  Outcome: Resolved/Met  Goal: *STG: Maintains appropriate boundaries  Outcome: Resolved/Met  Goal: *STG: Attends activities and groups  Outcome: Resolved/Met  Goal: Interventions  Outcome: Progressing Towards Goal

## 2021-10-25 NOTE — PROGRESS NOTES
Problem: Manic Behavior (Adult/Pediatric)  Goal: *STG: Participates in treatment plan  Outcome: Progressing Towards Goal  Pt complies with meds and verbalizes needs appropriately.

## 2021-10-25 NOTE — PROGRESS NOTES
Problem: Falls - Risk of  Goal: *Absence of Falls  Description: Document Jerome Flow Fall Risk and appropriate interventions in the flowsheet. Outcome: Progressing Towards Goal  Note: Patient received resting in bed with eyes closed. NAD and no complaints noted. Safety measures in place. Will continue to monitor with q15min rounds.        Fall Risk Interventions:            Medication Interventions: Teach patient to arise slowly

## 2021-10-25 NOTE — BH NOTES
GROUP THERAPY PROGRESS NOTE    Patient did not participate in Coping Skills group.     AGUSTINA Tirado, [facility]

## 2021-10-25 NOTE — SUICIDE SAFETY PLAN
SAFETY PLAN    A suicide Safety Plan is a document that supports someone when they are having thoughts of suicide. Warning Signs that indicate a suicidal crisis may be developing: What (situations, thoughts, feelings, body sensations, behaviors, etc.) do you experience that lets you know you are beginning to think about suicide? 1. Racing thoughts  2. Loneliness  3. Sleeplessness    Internal Coping Strategies:  What things can I do (relaxation techniques, hobbies, physical activities, etc.) to take my mind off my problems without contacting another person? 1. Go for a walk  2. Meditate and pray  3. Rest and sleep    People and social settings that provide distraction: Who can I call or where can I go to distract me? 1. Name: Charleen Stephanie ()      Phone: in my phone  2. Name: Marian Chadwick (brother-in-law)    Phone: in my phone  3. Place: [facility]       4. Place: [address] [place] [address] whom I can ask for help: Who can I call when I need help - for example, friends, family, clergy, someone else? 1. Name: [employer] (Clergy)           Phone: In my phone  2. Name: Zuleima Pressley (clergy) Phone: In my phone  3. Name: Parents              Phone: In my phone    Professionals or [address] [place] [address] I can contact during a crisis: Who can I call for help - for example, my doctor, my psychiatrist, my psychologist, a mental health provider, a suicide hotline? 1. Clinician Name:                                          Phone:       Clinician Pager or Emergency Contact #:     2. Clinician Name:                                          Phone:       Clinician Pager or Emergency Contact #:    3. Suicide Prevention Lifeline: 6-863-201-TALK (3830)    4.  [address] [place] [address] Emergency Services -  for example, [address] suicide hotline, [address] [place] [address] Avenue: [facility]      Emergency Services Address:   [address] [place] [address]  Emergency Services Phone: [phone]    Making the environment safe: How can I make my environment (house/apartment/living space) safer? For example, can I remove guns, medications, and other items? 1. Keep medications in the correct place  2. Have contact numbers to ask for help in plain sight  3. Delete social media apps from my phone  4. Keep talking to my support group daily regardless how I feel. 5. Have friends visit to check-in  6. Keep daily journal  7. Develop better habits.

## 2021-10-25 NOTE — BH NOTES
PRN Medication Documentation    Specific patient behavior that led to need for PRN medication: Patient complained of right hip pain  Staff interventions attempted prior to PRN being given: Offered PRN medication. PRN medication given: Tylenol 650 mg PO  Patient response/effectiveness of PRN medication:  Will continue  to monitor.

## 2021-10-26 VITALS
TEMPERATURE: 97.5 F | OXYGEN SATURATION: 98 % | HEART RATE: 93 BPM | DIASTOLIC BLOOD PRESSURE: 78 MMHG | RESPIRATION RATE: 18 BRPM | SYSTOLIC BLOOD PRESSURE: 125 MMHG | HEIGHT: 67 IN | WEIGHT: 176.3 LBS | BODY MASS INDEX: 27.67 KG/M2

## 2021-10-26 PROCEDURE — 74011250637 HC RX REV CODE- 250/637: Performed by: NURSE PRACTITIONER

## 2021-10-26 RX ORDER — OLANZAPINE 15 MG/1
15 TABLET ORAL EVERY EVENING
Qty: 30 TABLET | Refills: 0 | Status: SHIPPED | OUTPATIENT
Start: 2021-10-26 | End: 2021-11-05 | Stop reason: DRUGHIGH

## 2021-10-26 RX ORDER — HYDROXYZINE 50 MG/1
50 TABLET, FILM COATED ORAL
Qty: 90 TABLET | Refills: 0 | Status: SHIPPED | OUTPATIENT
Start: 2021-10-26 | End: 2021-11-25

## 2021-10-26 RX ORDER — TRAZODONE HYDROCHLORIDE 100 MG/1
50 TABLET ORAL
Qty: 30 TABLET | Refills: 0 | Status: SHIPPED | OUTPATIENT
Start: 2021-10-26 | End: 2021-12-20 | Stop reason: SDUPTHER

## 2021-10-26 RX ADMIN — HYDROXYZINE HYDROCHLORIDE 50 MG: 50 TABLET, FILM COATED ORAL at 08:45

## 2021-10-26 NOTE — DISCHARGE SUMMARY
DISCHARGE SUMMARY    Some parts of the discharge summary are from the initial Psychiatric interview that was done on admission by the admitting psychiatrist.      Date of Admission: 10/22/2021    Date of Discharge:10/26/2021     TYPE OF DISCHARGE:   REGULAR -  YES    AMA  RELEASED BY THE [facility] COURT     CHIEF COMPLAINT: \"I've been better, but I feel like I'm doing way better than I was last night. \"      HISTORY OF PRESENTING COMPLAINT:  Augusta Dumont is a 42 y.o. WHITE/NON- male who is currently admitted to the psychiatric floor at [facility]. Reason for admission is a manic episode starting on Monday. Pt and his wife belief manic episode was triggered by steroids given to him 10/16/21 for shingles. During this episode he was not sleeping at all most nights over the last week, inviting people to the house he normally wouldn't, waking wife in the middle of the night, becoming hyperverbal, walking the neighborhood at night, talking to strangers, making spiritual statements like \"I have to do what the holy spirit tells me to do. \"   Chetan Orozco states he is feeling better today than yesterday. He slept 5 hours last night. He states \"I feel like this is a good place for me if I'm actually struggling with jorge. \" He reports he was on an \"every other night sleeping schedule. \" He reports he was feeling upset last night because he felt \"trapped\" when he wanted to leave. He states he feels like \"I accidentally went to [facility] instead of the hospital.\" He denies current thoughts of hurting himself or others.      PAST PSYCHIATRIC, PSYCHOSOCIAL HISTORY and SUBSTANCE ABUSE HISTORY:  Psychiatric history includes ADHD and depression. This is his first psychiatric hospitalization. He has seen psychiatrists in the past for ADHD treatment. He denies ever having manic episodes before but he states \"I recognize the pattern of ups and downs, having energy, being depressed. \" Family psychiatric history includes father with bipolar disorder. He currently takes Zoloft 50mg prescribed by PCP Erlinda Sanchez. Past psychiatric medications include Wellbutrin, Vyvanse, No hx of auditory or visual hallucinations. He is currently not working, but staying home with his son. Highest level of education is some college. He is  with a 3year old son. He drinks alcohol socially. He states he doesn't smoke THC but \"uses CBD\" and sometimes vapes. No legal hx, no hx of violence towards others.      PAST MEDICAL HISTORY:  Please see H&P for details.           Past Medical History:   Diagnosis Date    ADHD (attention deficit hyperactivity disorder) 5/1/2013    Depression 5/1/2013              Prior to Admission medications    Medication Sig Start Date End Date Taking? Authorizing Provider   sertraline (ZOLOFT) 50 mg tablet Take 1 Tablet by mouth daily. 9/17/21     Rigoberto Mehta,    fluticasone (FLONASE) 50 mcg/actuation nasal spray 2 Sprays by Both Nostrils route daily for 90 days.  Long term, three month supply  Indications: ALLERGIC RHINITIS 2/24/17 5/25/17   Lili Emery NP            Vitals:     10/22/21 1144 10/22/21 1935 10/22/21 2027   BP: (!) 153/91 114/80 (!) 132/91   Pulse: 94 65 71   Resp: 16 14 16   Temp: 98.3 °F (36.8 °C) 98.4 °F (36.9 °C) 97.4 °F (36.3 °C)   SpO2: 100% 99% 98%            Lab Results   Component Value Date/Time     WBC 10.8 10/22/2021 01:46 PM     HGB 14.5 10/22/2021 01:46 PM     HCT 42.8 10/22/2021 01:46 PM     PLATELET 762 61/[birth year]/1928 01:46 PM     MCV 82.5 10/22/2021 01:46 PM            Lab Results   Component Value Date/Time     Sodium 136 10/22/2021 01:46 PM     Potassium 3.7 10/22/2021 01:46 PM     Chloride 103 10/22/2021 01:46 PM     CO2 29 10/22/2021 01:46 PM     Anion gap 4 (L) 10/22/2021 01:46 PM     Glucose 91 10/22/2021 01:46 PM     BUN 20 10/22/2021 01:46 PM     Creatinine 0.91 10/22/2021 01:46 PM     BUN/Creatinine ratio 22 (H) 10/22/2021 01:46 PM     GFR est AA >60 10/22/2021 01:46 PM     GFR est non-AA >60 10/22/2021 01:46 PM     Calcium 9.0 10/22/2021 01:46 PM     Bilirubin, total 0.8 10/22/2021 01:46 PM     Alk. phosphatase 67 10/22/2021 01:46 PM     Protein, total 7.9 10/22/2021 01:46 PM     Albumin 3.8 10/22/2021 01:46 PM     Globulin 4.1 (H) 10/22/2021 01:46 PM     A-G Ratio 0.9 (L) 10/22/2021 01:46 PM     ALT (SGPT) 33 10/22/2021 01:46 PM     AST (SGOT) 24 10/22/2021 01:46 PM      No results found for: VALF2, VALAC, VALP, VALPR, DS6, CRBAM, CRBAMP, CARB2, XCRBAM  No results found for: LITHM  RADIOLOGY REPORTS:(reviewed/updated 10/23/2021)  No results found. No results found for: Gianfranoc Schulz, [id number], [id number], [id number], PREGU, POCHCG, MHCGN, HCGQR, THCGA1, SHCG, HCGN, HCGSERUM, HCGURQLPOC         MENTAL STATUS EXAM:  General appearance:  Poorly groomed, psychomotor activity is WNL  Eye contact: moderate eye contact  Speech: slightly hyperverbal  Affect : appropriate  Mood: \"better\"  Thought Process: Logical, goal directed  Perception: Denies AH or VH. Thought Content: denies SI or Plan  Insight: Partial  Judgement: Fair  Cognition: Intact grossly.         ASSESSMENT AND PLAN:  Mague Peng meets criteria for a diagnosis of unspecified affective disorder and ADHD. -Begin scheduled Zyprexa 7.5mg QHS, as pt tolerated 5mg dose last night well and it was moderately effective; increase next week if needed. Continue inpatient stay for the safety and optimum care of the patient   Routine labs to be ordered as needed. Psychotropic medications will be ordered and adjusted as needed. Patients status is Voluntary  Supportive, milieu and group therapy. Continue rest of the medications as needed. Strengths include ability to seek help and family support  Estimated length of stay is 5-7 days.         Course in the Hospital:     Patient was admitted to the inpatient psychiatry unit for acute psychiatric stabilization in regards to symptomatology as described in the HPI above and placed on Q15 minute checks and withdrawal precautions. While on the unit Shashi Saleh was involved in individual, group, occupational and milieu therapy. He was started back on his usual medication regimen as well as PRN medications including Zyprexa, Trazodone for sleep, and Vistaril for anxiety. He improved gradually and was able to integrate into the milieu with help from the nursing staff. Patients symptoms improved gradually including labile moods, grandiosity, poor sleep, irritability, increased levels of activity. He was quite on the unit, appropriate in his interactions, and cooperative with medications and the unit routine. Please see individual progress notes for more specific details regarding patient's hospitalization course. Patient was discharged as per the plan. He had been doing well on the unit as per the report of the nursing staff and my observations. No PRN medication for agitation, seclusion or restraints were required during the last 48 hours of his stay. Shashi Saleh had improved progressively to the point of being stable for discharge and outpatient FU. At this time he did not offer any complaints. Patient denied any SI or HI. Denied any AH or VH. He denied any delusions. Was not considered a danger to self or to others and is safe for discharge. Will FU with his appointments and remains motivated to be in treatment. The patient verbalized understanding of his discharge instructions. DISCHARGE DIAGNOSIS:  Bipolar Disorder Unspecified. ? Steroid Induced, R/O Bipolar 1 Disorder, H/O ADD, Recurrent MDD. MENTAL STATUS EXAM ON DISCHARGE:    General appearance:   Shashi Saleh is a 42 y.o. WHITE/NON- male who is well groomed, psychomotor activity is WNL  Eye contact: makes good eye contact  Speech: Spontaneous and coherent  Affect : Euthymic  Mood: \"OK\"  Thought Process: Logical, goal directed  Perception: Denies any AH or VH.    Thought Content: Denies any SI or Plan  Insight: Partial  Judgement: Fair  Cognition: Intact grossly. Current Discharge Medication List      START taking these medications    Details   hydrOXYzine HCL (ATARAX) 50 mg tablet Take 1 Tablet by mouth three (3) times daily as needed for Anxiety for up to 30 days. Indications: anxious  Qty: 90 Tablet, Refills: 0  Start date: 10/26/2021, End date: 11/25/2021      OLANZapine (ZyPREXA) 15 mg tablet Take 1 Tablet by mouth every evening. Indications: bipolar disorder in remission  Qty: 30 Tablet, Refills: 0  Start date: 10/26/2021      traZODone (DESYREL) 100 mg tablet Take 0.5 Tablets by mouth nightly as needed for Sleep (For insomnia). Indications: insomnia associated with depression  Qty: 30 Tablet, Refills: 0  Start date: 10/26/2021         CONTINUE these medications which have NOT CHANGED    Details   fluticasone (FLONASE) 50 mcg/actuation nasal spray 2 Sprays by Both Nostrils route daily for 90 days.  Long term, three month supply  Indications: ALLERGIC RHINITIS  Qty: 3 Bottle, Refills: 3    Associated Diagnoses: Acute maxillary sinusitis, recurrence not specified         STOP taking these medications       sertraline (ZOLOFT) 50 mg tablet Comments:   Reason for Stopping:              No results found for: VALF2, VALAC, VALP, VALPR, DS6, CRBAM, CRBAMP, CARB2, XCRBAM  No results found for: LITHM  Follow-up Information     Follow up With Specialties Details Why Contact Info    [address] [facility]   On 11/5/2021 you have a 11:00 appointment with Noe Villar NP  [facility], [address] [place] [address] Suite [facility], [place], [address] [facility]  [phone]    [address]   On 10/28/2021 you have a 30 minute phone call at 10:30  [address] [facility] nurse phone call     ChristyJulio Keene [facility]  [phone]      Janette Shi DO [facility]   [address]  [address] [place] [address] [address]  [phone]          WOUND CARE: none needed. PROGNOSIS:   Good / Fair based on nature of patient's pathology/ies and treatment compliance issues. Prognosis is greatly dependent upon patient's ability to  follow up on psychiatric/psychotherapy appointments as well as to comply with psychiatric medications as prescribed.

## 2021-10-26 NOTE — BH NOTES
Behavioral Health Transition Record to Provider    Patient Name: Analilia Jones  YOB: 1980  Medical Record Number: [id number]  Date of Admission: 10/22/2021  Date of Discharge: 10/26/2021   Attending Provider: No att. providers found  Discharging Provider: Omar Sol   To contact this individual call [phone] and ask the  to page. If unavailable, ask to be transferred to [address] [place] [address] Provider on call. [facility] Provider will be available on call 24/7 and during holidays. Primary Care Provider: Zulma Jules MD    Allergies   Allergen Reactions    Prednisone Anxiety       Reason for Admission: CHIEF COMPLAINT: \"I've been better, but I feel like I'm doing way better than I was last night. \"      HISTORY OF PRESENTING COMPLAINT:  Susi Martinez a 42 y.o. WHITE/NON- male who is currently admitted to the psychiatric floor at [facility]. Reason for admission is a manic episode starting on Monday. Pt and his wife belief manic episode was triggered by steroids given to him 10/16/21 for shingles. During this episode he was not sleeping at all most nights over the last week, inviting people to the house he normally wouldn't, waking wife in the middle of the night, becoming hyperverbal, walking the neighborhood at night, talking to strangers, making spiritual statements like \"I have to do what the holy spirit tells me to do. \"   Jia Hilario states he is feeling better today than yesterday. He slept 5 hours last night. He states \"I feel like this is a good place for me if I'm actually struggling with jorge. \" He reports he was on an \"every other night sleeping schedule. \" He reports he was feeling upset last night because he felt \"trapped\" when he wanted to leave.  He states he feels like \"[facility] went [facility].\" He denies current thoughts of hurting himself or others.     Admission Diagnosis: Unspecified mood (affective) disorder ([facility] [address] [F39]    * No surgery found *    Results for orders placed or performed during the hospital encounter of 10/22/21   URINE CULTURE HOLD SAMPLE    Specimen: Serum; Urine   Result Value Ref Range    Urine culture hold        Urine on hold in Microbiology dept for 2 days. If unpreserved urine is submitted, it cannot be used for addtional testing after 24 hours, recollection will be required. COVID-19 WITH INFLUENZA A/B   Result Value Ref Range    SARS-CoV-2 Not detected NOTD      Influenza A by PCR Not detected NOTD      Influenza B by PCR Not detected NOTD     CBC WITH AUTOMATED DIFF   Result Value Ref Range    WBC 10.8 4.1 - 11.1 K/uL    RBC 5.19 4. 10 - 5.70 M/uL    HGB 14.5 12.1 - 17.0 g/dL    HCT 42.8 36.6 - 50.3 %    MCV 82.5 80.0 - 99.0 FL    MCH 27.9 26.0 - 34.0 PG    MCHC 33.9 30.0 - 36.5 g/dL    RDW 12.8 11.5 - 14.5 %    PLATELET 096 102 - 918 K/uL    MPV 8.7 (L) 8.9 - 12.9 FL    NRBC 0.0 0  WBC    ABSOLUTE NRBC 0.00 0.00 - 0.01 K/uL    NEUTROPHILS 66 32 - 75 %    LYMPHOCYTES 19 12 - 49 %    MONOCYTES 10 5 - 13 %    EOSINOPHILS 4 0 - 7 %    BASOPHILS 1 0 - 1 %    IMMATURE GRANULOCYTES 0 0.0 - 0.5 %    ABS. NEUTROPHILS 7.1 1.8 - 8.0 K/UL    ABS. LYMPHOCYTES 2.1 0.8 - 3.5 K/UL    ABS. MONOCYTES 1.0 0.0 - 1.0 K/UL    ABS. EOSINOPHILS 0.4 0.0 - 0.4 K/UL    ABS. BASOPHILS 0.1 0.0 - 0.1 K/UL    ABS. IMM. GRANS. 0.0 0.00 - 0.04 K/UL    DF AUTOMATED     METABOLIC PANEL, COMPREHENSIVE   Result Value Ref Range    Sodium 136 136 - 145 mmol/L    Potassium 3.7 3.5 - 5.1 mmol/L    Chloride 103 97 - 108 mmol/L    CO2 29 21 - 32 mmol/L    Anion gap 4 (L) 5 - 15 mmol/L    Glucose 91 65 - 100 mg/dL    BUN 20 6 - 20 MG/DL    Creatinine 0.91 0.70 - 1.30 MG/DL    BUN/Creatinine ratio 22 (H) 12 - 20      GFR est AA >60 >60 ml/min/1.73m2    GFR est non-AA >60 >60 ml/min/1.73m2    Calcium 9.0 8.5 - 10.1 MG/DL    Bilirubin, total 0.8 0.2 - 1.0 MG/DL    ALT (SGPT) 33 12 - 78 U/L    AST (SGOT) 24 15 - 37 U/L    Alk.  phosphatase 67 45 - 117 U/L    Protein, total 7.9 6.4 - 8.2 g/dL    Albumin 3.8 3.5 - 5.0 g/dL    Globulin 4.1 (H) 2.0 - 4.0 g/dL    A-G Ratio 0.9 (L) 1.1 - 2.2     ETHYL ALCOHOL   Result Value Ref Range    ALCOHOL(ETHYL),SERUM <46 <43 MG/DL   SALICYLATE   Result Value Ref Range    Salicylate level <3.0 (L) 2.8 - 20.0 MG/DL   ACETAMINOPHEN   Result Value Ref Range    Acetaminophen level <2 (L) 10 - 30 ug/mL   MAGNESIUM   Result Value Ref Range    Magnesium 2.4 1.6 - 2.4 mg/dL   DRUG SCREEN, URINE   Result Value Ref Range    AMPHETAMINES Negative NEG      BARBITURATES Negative NEG      BENZODIAZEPINES Negative NEG      COCAINE Negative NEG      METHADONE Negative NEG      OPIATES Negative NEG      PCP(PHENCYCLIDINE) Negative NEG      THC (TH-CANNABINOL) Positive (A) NEG      Drug screen comment (NOTE)    URINALYSIS W/MICROSCOPIC   Result Value Ref Range    Color YELLOW/STRAW      Appearance CLEAR CLEAR      Specific gravity 1.012 1.003 - 1.030      pH (UA) 7.0 5.0 - 8.0      Protein Negative NEG mg/dL    Glucose Negative NEG mg/dL    Ketone Negative NEG mg/dL    Bilirubin Negative NEG      Blood Negative NEG      Urobilinogen 0.2 0.2 - 1.0 EU/dL    Nitrites Negative NEG      Leukocyte Esterase Negative NEG      WBC 0-4 0 - 4 /hpf    RBC 0-5 0 - 5 /hpf    Epithelial cells FEW FEW /lpf    Bacteria Negative NEG /hpf    Hyaline cast 0-2 0 - 5 /lpf   TSH 3RD GENERATION   Result Value Ref Range    TSH 1.07 0.36 - 3.74 uIU/mL   EKG, 12 LEAD, INITIAL   Result Value Ref Range    Ventricular Rate 68 BPM    Atrial Rate 68 BPM    P-R Interval 136 ms    QRS Duration 100 ms    Q-T Interval 404 ms    QTC Calculation (Bezet) 429 ms    Calculated P Axis 50 degrees    Calculated R Axis -9 degrees    Calculated T Axis 30 degrees    Diagnosis       Normal sinus rhythm  Normal ECG  No previous ECGs available  Confirmed by Gilma Hannon MD. ([id number]) on 10/24/2021 7:23:33 PM         Immunizations administered during this encounter:   Immunization History   Administered Date(s) Administered    TB Skin Test (PPD) Intradermal 05/01/2013       Screening for Metabolic Disorders for Patients on Antipsychotic Medications  (Data obtained from the EMR)    Estimated Body Mass Index  Estimated body mass index is 27.61 kg/m² as calculated from the following:    Height as of this encounter: 5' 7\" (1.702 m). Weight as of this encounter: 80 kg (176 lb 4.8 oz). Vital Signs/Blood Pressure  Visit Vitals  /78   Pulse 93   Temp 97.5 °F (36.4 °C)   Resp 18   Ht 5' 7\" (1.702 m)   Wt 80 kg (176 lb 4.8 oz)   SpO2 98%   BMI 27.61 kg/m²       Blood Glucose/Hemoglobin A1c  Lab Results   Component Value Date/Time    Glucose 91 10/22/2021 01:46 PM       Lab Results   Component Value Date/Time    Hemoglobin A1c 5.6 08/13/2021 12:00 AM        Lipid Panel  Lab Results   Component Value Date/Time    Cholesterol, total 180 08/13/2021 12:00 AM    HDL Cholesterol 38 (L) 08/13/2021 12:00 AM    LDL, calculated 102 (H) 08/13/2021 12:00 AM    Triglyceride 236 (H) 08/13/2021 12:00 AM        Discharge Diagnosis: Bipolar Disorder Unspecified. ? Steroid Induced, R/O Bipolar 1 Disorder, H/O ADD, Recurrent MDD.        Discharge Plan: He will return home. The patient Ceci Rivero exhibits the ability to control behavior in a less restrictive environment. Patient's level of functioning is improving. No assaultive/destructive behavior has been observed for the past 24 hours. No suicidal/homicidal threat or behavior has been observed for the past 24 hours. There is no evidence of serious medication side effects. Patient has not been in physical or protective restraints for at least the past 24 hours. If weapons involved, how are they secured? No weapons involved     Is patient aware of and in agreement with discharge plan? He is aware of discharge and is in agreement     Arrangements for medication:  Prescriptions sent to your pharmacy.     Copy of discharge instructions to provider?:  Yes , [phone]    Arrangements for transportation home:  Wife to pickup at 12:00noon     Keep all follow up appointments as scheduled, continue to take prescribed medications per physician instructions. Mental health crisis number:  761 or your local mental health crisis line number at [phone]           Discharge Medication List and Instructions:   Discharge Medication List as of 10/26/2021 12:18 PM      START taking these medications    Details   hydrOXYzine HCL (ATARAX) 50 mg tablet Take 1 Tablet by mouth three (3) times daily as needed for Anxiety for up to 30 days. Indications: anxious, Normal, Disp-90 Tablet, R-0      OLANZapine (ZyPREXA) 15 mg tablet Take 1 Tablet by mouth every evening. Indications: bipolar disorder in remission, Normal, Disp-30 Tablet, R-0      traZODone (DESYREL) 100 mg tablet Take 0.5 Tablets by mouth nightly as needed for Sleep (For insomnia). Indications: insomnia associated with depression, Normal, Disp-30 Tablet, R-0         CONTINUE these medications which have NOT CHANGED    Details   fluticasone (FLONASE) 50 mcg/actuation nasal spray 2 Sprays by Both Nostrils route daily for 90 days.  Long term, three month supply  Indications: ALLERGIC RHINITIS, Normal, Disp-3 Bottle, R-3         STOP taking these medications       sertraline (ZOLOFT) 50 mg tablet Comments:   Reason for Stopping:               Unresulted Labs (24h ago, onward)    None        To obtain results of studies pending at discharge, please contact [phone]    Follow-up Information     Follow up With Specialties Details Why [address] [place] [address]   On 11/5/2021 you have a 11:00 appointment with Carol Strong NP  [facility], [address] [facility] Suite [facility], [place], [address] [facility]  [phone]    [address]   On 10/28/2021 you have a 30 minute phone call at 10:30  [address] [facility] nurse phone call     Luis Eric MD Family Medicine   [address] [address] [address]  [phone]      [facility]   [address]  [address] [address]  [phone]      KARRIE Ezequiel [facility] [facility]   Schedule an appointment as soon as possible for a visit  3 [facility] Ericka RED [address] [place] [address]  Hours:   Open · Closes 8PM  Phone: [phone]    [facility]   Schedule an appointment as soon as possible for a visit  [facility]  Hours:   Open · Closes 8PM  Phone: [phone]          Advanced Directive:   Does the patient have an appointed surrogate decision maker? No  Does the patient have a Medical Advance Directive? No  Does the patient have a Psychiatric Advance Directive? No  If the patient does not have a surrogate or Medical Advance Directive AND Psychiatric Advance Directive, the patient was offered information on these advance directives Patient declined to complete    Patient Instructions: Please continue all medications until otherwise directed by physician. Tobacco Cessation Discharge Plan:   Is the patient a smoker and needs referral for smoking cessation? No  Patient referred to the following for smoking cessation with an appointment? No     Patient was offered medication to assist with smoking cessation at discharge? No  Was education for smoking cessation added to the discharge instructions? Yes    Alcohol/Substance Abuse Discharge Plan:   Does the patient have a history of substance/alcohol abuse and requires a referral for treatment? No  Patient referred to the following for substance/alcohol abuse treatment with an appointment? No  Patient was offered medication to assist with alcohol cessation at discharge? No  Was education for substance/alcohol abuse added to discharge instructions? No    Patient discharged to Home; discussed with patient/caregiver and provided to the patient/caregiver either in hard copy or electronically.

## 2021-10-26 NOTE — PROGRESS NOTES
Problem: Manic Behavior (Adult/Pediatric)  Goal: *STG: Participates in treatment plan  Outcome: Progressing Towards Goal  Note: Out on unit demonstrates intrusive behaviors, requiring frequent redirection to follow unit routine and nursing direction, demonstrates difficulty accepting feedback and poor insight into his behaviors. Poor sleep with total of 3 hours. Deflective and defensive when discussing manic like symptoms and his sleep/behaviors. Staff focus is on offering support, redirection and d/c plans. Daily goal is to d/c home.    Goal: *STG: Seeks staff when feelings of anxiety and fear arise  Outcome: Progressing Towards Goal  Goal: *STG: Remains safe in hospital  Outcome: Progressing Towards Goal  Goal: *STG/LTG: Complies with medication therapy  Outcome: Progressing Towards Goal  Goal: Interventions  Outcome: Progressing Towards Goal

## 2021-10-26 NOTE — INTERDISCIPLINARY ROUNDS
Behavioral Health Interdisciplinary Rounds     Patient Name: Aj Sibley  Age: 42 y.o. Room/Bed:  733/  Primary Diagnosis: <principal problem not specified>   Admission Status: Voluntary     Readmission within 30 days: no  Power of  in place: no  Patient requires a blocked bed: no          Reason for blocked bed:     VTE Prophylaxis: No    Mobility needs/Fall risk: no  Flu Vaccine :    Nutritional Plan: no  Consults:          Labs/Testing due today?: no    Sleep hours:  1      Participation in Care/Groups:  yes  Medication Compliant?: Yes  PRNS (last 24 hours): Antianxiety, Sleep Aid and Pain    Restraints (last 24 hours):  no     CIWA (range last 24 hours):     COWS (range last 24 hours):      Alcohol screening (AUDIT) completed -   AUDIT Score: 2     If applicable, date SBIRT discussed in treatment team AND documented:   AUDIT Screen Score: AUDIT Score: 2      Tobacco - patient is a smoker: Have You Used Tobacco in the Past 30 Days: No  Illegal Drugs use: Have You Used Any Illegal Substances Over the Past 12 Months: No    24 hour chart check complete: yes     Patient goal(s) for today:   Treatment team focus/goals: Plan for discharge today. Progress note :He denies SI and is pleasant and compliant with his treatment. LOS:  4  Expected LOS: Today     Financial concerns/prescription coverage: [facility]   Family contact: wife      Family requesting physician contact today:    Discharge plan: He will return home. Access to weapons : no  Outpatient provider(s): [facility]   Patient's preferred phone number for follow up call :   Patient's preferred e-mail address :  Participating treatment team members: Aj Sibley, Emanuel Ruano RN ,

## 2021-10-26 NOTE — PROGRESS NOTES
Problem: Falls - Risk of  Goal: *Absence of Falls  Description: Document [place] Fall Risk and appropriate interventions in the flowsheet. Outcome: Progressing Towards Goal  Note: Fall Risk Interventions:     Medication Interventions: Teach patient to arise slowly    Problem: Manic Behavior (Adult/Pediatric)  Goal: *STG: Remains safe in hospital  Outcome: Progressing Towards Goal    Patient received resting quietly in bed. No signs of distress. Even and unlabored breathing. Staff will continue to monitor safety q15 and provide support.

## 2021-10-26 NOTE — DISCHARGE INSTRUCTIONS
DISCHARGE SUMMARY    Marcus Rick  : 1980  MRN: [id number]    The patient Mague Peng exhibits the ability to control behavior in a less restrictive environment. Patient's level of functioning is improving. No assaultive/destructive behavior has been observed for the past 24 hours. No suicidal/homicidal threat or behavior has been observed for the past 24 hours. There is no evidence of serious medication side effects. Patient has not been in physical or protective restraints for at least the past 24 hours. If weapons involved, how are they secured? No weapons involved     Is patient aware of and in agreement with discharge plan? He is aware of discharge and is in agreement     Arrangements for medication:  Prescriptions sent to your pharmacy. Copy of discharge instructions to provider?:  Yes , [phone]    Arrangements for transportation home:  Wife to pickup at 12:00noon     Keep all follow up appointments as scheduled, continue to take prescribed medications per physician instructions. Mental health crisis number:  178 or your local mental health crisis line number at [address] [facility] Emergency WARM LINE      [phone]-MHAV 0466)      M-F: 9am to 9pm      Sat & Sun: [facility] suicide prevention lines:                             6-806-KKGJUXC ([phone])       [phone] ([phone])    Crisis Text Line:  Text HOME to KARRIE Dos Santos 9 from Nurse    PATIENT INSTRUCTIONS:      What to do at Home:  Recommended activity: Activity as tolerated,     *  Please give a list of your current medications to your Primary Care Provider. *  Please update this list whenever your medications are discontinued, doses are      changed, or new medications (including over-the-counter products) are added. *  Please carry medication information at all times in case of emergency situations.     These are general instructions for a healthy lifestyle:    No smoking/ No tobacco products/ Avoid exposure to second hand smoke  Surgeon General's Warning:  Quitting smoking now greatly reduces serious risk to your health. Obesity, smoking, and sedentary lifestyle greatly increases your risk for illness    A healthy diet, regular physical exercise & weight monitoring are important for maintaining a healthy lifestyle    You may be retaining fluid if you have a history of heart failure or if you experience any of the following symptoms:  Weight gain of 3 pounds or more overnight or 5 pounds in a week, increased swelling in our hands or feet or shortness of breath while lying flat in bed. Please call your doctor as soon as you notice any of these symptoms; do not wait until your next office visit. The discharge information has been reviewed with the patient. The patient verbalized understanding. Discharge medications reviewed with the patient and appropriate educational materials and side effects teaching were provided.   ___________________________________________________________________________________________________________________________________

## 2021-10-26 NOTE — BH NOTES
GROUP THERAPY PROGRESS NOTE    Patient is participating in Coping Skills group. Group time: 45 minutes    Personal goal for participation: To develop an understanding of the coping skill emotion masking. To identify our own emotion masks and discover how and when we use them. Goal orientation: Personal    Group therapy participation: active / minimal / passive / disruptive / uncontrolled    Therapeutic interventions reviewed and discussed:  Group members were given information about emotion masking. Members engaged in activity to identify and visualize their personal masks. Members were able to engage in conversation about emotion masking with peers. Worksheet provided. Impression of participation: Pt was present and engaged in group discussion. Pt offered insight into group topic. Pts mood was euthymic and congruent with their appearance.        Angel Richard, AGUSTINA, QMHP-A

## 2021-10-27 ENCOUNTER — PATIENT OUTREACH (OUTPATIENT)
Dept: OTHER | Age: 41
End: 2021-10-27

## 2021-10-27 NOTE — PROGRESS NOTES
Care Transitions Outreach Attempt    Call within 2 business days of discharge: Yes   Attempted to reach patient for transitions of care follow up. Unable to reach patient. Patient: Mouna Li Patient : 1980 MRN: [id number]    Last Discharge [address]       Complaint Diagnosis Description Type Department Provider    10/22/21 Mental Health Problem [facility]) ED to Hosp-Admission (Discharged) (ADMIT) Jenelle Ocampo MD; Kathleen Quintana.. Was this an external facility discharge?  No Discharge Facility: [facility]      Noted following upcoming appointments from discharge chart review:   [facility] follow up appointment(s):   Future Appointments   Date Time Provider Michael Lincoln   2021 10:30 AM [facility] NURSE [facility] AMB   2021 11:00 AM Marielle Valentino, NP [facility] AMB   2021 11:15 AM Rigoberto Mehta, DO [facility] AMB     Non-[facility] follow up appointment(s): NA

## 2021-10-28 ENCOUNTER — PATIENT OUTREACH (OUTPATIENT)
Dept: OTHER | Age: 41
End: 2021-10-28

## 2021-10-28 NOTE — PROGRESS NOTES
Care Transitions Initial Call    Call within 2 business days of discharge: Yes     Patient: Neeta Lebron Patient : 1980 MRN: [id number]    Last Discharge [address]       Complaint Diagnosis Description Type Department Provider    10/22/21 Mental Health Problem [facility]) ED to Hosp-Admission (Discharged) (ADMIT) Keysha Land MD; Marjan Love... Spoke with Carmella Myles, patient's wife. Patient states he is feeling better. Initially, patient was put on prednisone and an anti viral for shingles. Manic episode - could not sleep, walked the neighborhood, texting, emailing in the middle of the night, on face book. Went to ED, released. Went back to ED at [facility] on 10/22, he was admitted to the behavioral health unit. Patient's wife states patient's father has diagnosis of bipolar. Discussed issues with hospitalization with Stacy, felt that she was not getting information from any providers prior to his discharge. She was told by the d/c RN that she had gone over d/c paperwork with patient. Stacy states she has phone number to patient advocate. Stacy feels that patient is still manic, no thoughts of hurting himself or others. He has f/up at 10:30 today with [facility]. Wife feels he should be on a mood stabilizer now, due to family history of bipolar. She states that he did sleep for 8 hours last night, which he has not done in days. Patient states he does feel better. Has appointment on  at [facility] with Viet Pizano NP. Was this an external facility discharge? No Discharge Facility: [facility]    Challenges to be reviewed by the provider   Additional needs identified to be addressed with provider: no  none         Method of communication with provider : none    Inpatient Readmission Risk score: Unplanned Readmit Risk Score: 7  Was this a readmission?  no   Patient stated reason for the admission: n/a    Patients top risk factors for readmission: none   Interventions to address risk factors: Scheduled appointment with PCP-, Scheduled appointment with Specialist-10/28 and Obtained and reviewed discharge summary and/or continuity of care documents    Care Transition Nurse (CTN) contacted the family by telephone to perform post hospital discharge assessment. Verified name and  with family as identifiers. Provided introduction to self, and explanation of the CTN role. CTN reviewed discharge instructions, medical action plan and red flags with family who verbalized understanding. Were discharge instructions available to patient? yes. Reviewed appropriate site of care based on symptoms and resources available to patient including: PCP, Specialist, Benefits related nurse triage line, [facility]  and When to call 911. Family given an opportunity to ask questions and does not have any further questions or concerns at this time. The family agrees to contact the PCP office for questions related to their healthcare. Making the environment safe: How can I make my environment (house/apartment/living space) safer? For example,  can I remove guns, medications, and other items? 1. Keep medications in the correct place  2. Have contact numbers to ask for help in plain sight  3. Delete social media apps from my phone  4. Keep talking to my support group daily regardless how I feel. 5. Have friends visit to check-in  6. Keep daily journal  7. Develop better habits. Medication reconciliation was performed with family, who verbalizes understanding of administration of home medications. Advised obtaining a 90-day supply of all daily and as-needed medications.    Referral to Pharm D needed: no     Home Health/Outpatient orders at discharge: [address]: none  Date of initial visit: none    Durable Medical Equipment ordered at discharge: None  [address]: none  Durable Medical Equipment received: none    Discussed follow-up appointments. If no appointment was previously scheduled, appointment scheduling offered: no. Is follow up appointment scheduled within 7 days of discharge? yes. [facility] follow up appointment(s):   Future Appointments   Date Time Provider Michael Latonia   11/4/2021 10:30 AM SAJI [facility] NURSE [facility]   11/5/2021 11:00 AM [facility]   11/12/2021 11:15 AM Rigoberto Mehta DO [facility]     Non-[facility] follow up appointment(s): n/a    Plan for follow-up call in 3-5 days based on severity of symptoms and risk factors. Plan for next call: follow up appointment-10/28  and medication management-new behavioral health medications     Encouraged Patient's wife, Wesley Myers, to call me back if she needs assistance with anything. Let her know that KURTIS Belle would follow up next week. CTN provided contact information for future needs. Goals Addressed                 This Visit's Progress     Completes all follow-up appointments within 30 days of ED visit         Educate and encourage importance of FU for prevention of complications or disease;   Assess the patient's relationship with a PCP and next FU visit scheduled;   Discuss importance of adherence to treatment plan and follow up visits;   Identify any barriers in transportation or access to FU appointments.  Assist patient with making FU appointments as needed;    It's also a good idea to know your test results.  Keep a list of the medicines you take.        Demonstrates no return to ED or red flags within 30 days        · Assess patient knowledge of how to contact the provider for questions if needed;  · Educate patient on importance of monitoring for any red flags;  · Review importance of reporting any changes, red flags to the provider and/or PCP;  · Assess patient's knowledge of discharge instructions and any restrictions;  · Educate patient when to call 911;  · Assess for any barriers with safety at home  · Discuss use of nurse access line and location of number on front of insurance card.   · Supportive Resources:  · [facility] - # [phone]  · [address] 24/7 (virtual visits 24/7)  · [facility] # [phone] PW: [facility] HORIZON for associates  · Nurse Access line 24/7 # [phone]  · [facility] ([email or web address])  · [address] [phone]  · [address] [facility] [phone]  · [employer] [facility] - [place]   · [facility] Work[employer]  · IT - [phone]  · MyChart Help - 1- [phone]  · Associate Services for advice and direction, by calling [phone] and pressing 1

## 2021-10-28 NOTE — PROGRESS NOTES
Received voicemail message from patient's wife, Shannan Chandler, stating that no one called them for their appointment at 10:30. She was unsure what to do. [address] at [facility] - [phone] spoke with staff member and explained that patient was waiting for a call this morning. Staff member stated that the phone call appointment is scheduled for 11/4 at 10:30 to go over paperwork and then office visit is on 11/5 at 11:00 am.  She stated that they must have the paperwork back to have the appointment on 11/4. Let her know that I would pass this information on to them. Called Stacy to inform her of above information. She will see how it will go over the next few days. She plans to reach out to his PCP to get further advice. Plans to encourage him to take his medications as directed even though he says he feels fine, she knows to use the nurse access line if she has questions about level of care, has patient's mom staying with them until Saturday (hopefully longer, if needed). Will go to [employer] and buy legos and puzzles to keep him occupied. Encouraged Stacy to take time for herself and let her know about [facility] program, may be beneficial for her to take advantage of counseling sessions. She was appreciative. Let her know she can call me if she needs any assistance.

## 2021-10-29 ENCOUNTER — PATIENT OUTREACH (OUTPATIENT)
Dept: OTHER | Age: 41
End: 2021-10-29

## 2021-10-29 NOTE — PROGRESS NOTES
Outreach on behalf of Mckay Padgett. Received prior permission to speak with patient's wife, Precious Zhang,. She states Lluvia Slater got rest last night and that he is taking a nap now. She plans to complete paperwork over the weekend prior to appointment on 11/4 with [facility]. No needs at this time. Let her know that KURTIS Padgett would follow up with her next week. She was appreciative.

## 2021-10-29 NOTE — PROGRESS NOTES
Reached out to patient at [phone] for follow up. Discussed follow up and will see [employer] NP next week.

## 2021-11-01 NOTE — PROGRESS NOTES
Reached out to patient at [phone] for follow up. Patient was not available at the time of call.

## 2021-11-03 ENCOUNTER — PATIENT OUTREACH (OUTPATIENT)
Dept: OTHER | Age: 41
End: 2021-11-03

## 2021-11-03 NOTE — PROGRESS NOTES
Telephonic outreach attempt to follow up with patient. Left a discreet VM with a request for a return call, providing contact information. Will continue outreach attempts.

## 2021-11-04 ENCOUNTER — DOCUMENTATION ONLY (OUTPATIENT)
Dept: BEHAVIORAL/MENTAL HEALTH CLINIC | Age: 41
End: 2021-11-04

## 2021-11-04 ENCOUNTER — OFFICE VISIT (OUTPATIENT)
Dept: BEHAVIORAL/MENTAL HEALTH CLINIC | Age: 41
End: 2021-11-04
Payer: COMMERCIAL

## 2021-11-04 DIAGNOSIS — Z13.9 SPECIAL SCREENING: Primary | ICD-10-CM

## 2021-11-04 PROCEDURE — 90000 NO LOS: CPT

## 2021-11-04 NOTE — PROGRESS NOTES
No  for patient. Patient has appointment on 11.04.21 with [facility] who was informed.
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
## 2021-11-05 ENCOUNTER — VIRTUAL VISIT (OUTPATIENT)
Dept: BEHAVIORAL/MENTAL HEALTH CLINIC | Age: 41
End: 2021-11-05
Payer: COMMERCIAL

## 2021-11-05 DIAGNOSIS — F90.9 ATTENTION DEFICIT HYPERACTIVITY DISORDER (ADHD), UNSPECIFIED ADHD TYPE: ICD-10-CM

## 2021-11-05 DIAGNOSIS — F31.12 BIPOLAR AFFECTIVE DISORDER, CURRENTLY MANIC, MODERATE (HCC): ICD-10-CM

## 2021-11-05 DIAGNOSIS — F31.10 BIPOLAR DISORDER, MOST RECENT EPISODE MANIC (HCC): Primary | ICD-10-CM

## 2021-11-05 PROCEDURE — 99203 OFFICE O/P NEW LOW 30 MIN: CPT | Performed by: NURSE PRACTITIONER

## 2021-11-05 RX ORDER — OLANZAPINE 10 MG/1
10 TABLET ORAL
Qty: 21 TABLET | Refills: 0 | Status: SHIPPED | OUTPATIENT
Start: 2021-11-05 | End: 2021-11-23 | Stop reason: DRUGHIGH
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
## 2021-11-08 ENCOUNTER — PATIENT OUTREACH (OUTPATIENT)
Dept: OTHER | Age: 41
End: 2021-11-08

## 2021-11-08 NOTE — PROGRESS NOTES
Telephonic outreach to patient to follow up after his hospital stay. Patient's wife Stacy answered the call, patient has given verbal permission for his wife to speak on his behalf. Stacy informed this ECM that the patient is improving since his hospitalization. He had a follow up with the NP to discuss medication management and review his current medications. Patient is having reported side effects on the Zyprexa, to include jittery movements and excessive sleepiness. The plan is titrate the patient off of the Zyprexa slowly, he is currently at 10mg. Patient has made a \"rule\" that he will only take two medications at a time. The NP would like him to start on Lamictal to stabilize his moods. He will not start taking the Lamictal until he is off the Zyprexa, only 2 medications at a time. Stacy is strongly encouraging him to agree to take the Lamictal. The patient is now starting to think that he actually does not need the medication at all, so we will continue a joint effort to get the patient to be adherent to his medications. Stacy has also sent in the paperwork to get an appointment for therapy with [facility]. Hoping therapy will help in regulating his moods and encouraging medication adherence. Will follow up with patient and his wife in 1-2 weeks.

## 2021-11-10 ENCOUNTER — PATIENT MESSAGE (OUTPATIENT)
Dept: BEHAVIORAL/MENTAL HEALTH CLINIC | Age: 41
End: 2021-11-10

## 2021-11-11 PROBLEM — F31.10 BIPOLAR DISORDER, MOST RECENT EPISODE MANIC (HCC): Status: ACTIVE | Noted: 2021-11-11

## 2021-11-11 RX ORDER — LAMOTRIGINE 25 MG/1
TABLET ORAL
Qty: 39 TABLET | Refills: 0 | Status: SHIPPED | OUTPATIENT
Start: 2021-11-11 | End: 2021-12-14

## 2021-11-11 NOTE — TELEPHONE ENCOUNTER
HPI    Kermit Reyna reports the following pychiatric symptoms:  agitation and jorge. The symptoms have been present for 2 weeks  and are of moderate severity. The symptoms occur contstantly. Associated symptoms include  agitation, anxiety, increased irritability, problem with medication and relationship difficulties. Kermit Reyna has a history of depression and ADHD and treated with Zoloft for depression. Recently admitted to [facility]  psych unit for manic episode October 22, 2021 and Discharge October 26, 2021. Patient reported jorge after taking prednisone for Shingles. During episode patient was not sleeping most nights, inviting people to house he normally would not, irritability, increase in mood lability,  talking to strangers, walking around neighborhood, hyperverbal, calling a lot of people, experiencing flight of ideas, starting plans for for new business, and thoughts of grandeur. Denied SI or HI, Denies hallucinations or delusions. Previously before this hospitalization, patient struggled with depression and irritability. Reports Zoloft being helpful for depression, but not effective in controlling anger, and attention deficit. Telephone call-Med request follow up     Patient sent message requesting to start Lamictal. Lamictal 25mg for three weeks and 50mg starting week 4 has been ordered. Patient will follow up on 11/23. We will assess tolerability and consider titration up to 200 mg at that time. Call patient at [phone] patient acknowledged risk and possible side effects. Addendum also added to previous note. Please see for more information.

## 2021-11-16 ENCOUNTER — PATIENT OUTREACH (OUTPATIENT)
Dept: OTHER | Age: 41
End: 2021-11-16

## 2021-11-16 NOTE — PROGRESS NOTES
Telephonic outreach to patient and his wife, Jan Rodriguez. Patient has given verbal consent for this ECM to speak to his wife. Verified two patient identifiers. Reviewed previous conversation and Stacy updated this ECM on recent improvements and changes. Patient began taking his Lamictal dose on 11/11. He is currently taking 25mg of Lamictal, Zyprexa 10mg (with plan to wean off), and Trazadone 100 mg at HS as needed. He has a prn of Atarax 50mg. Patient had his first therapy appointment this morning. He was hesitant to participate, but agreed to go if he wife would go in with him. She had their 3year old son, so he was also in the room. Both patient and his wife felt that the therapy was a success and look forward to the next session in a week. Stacy will be in the next session with him and then he will start going by himself. He will be seen weekly. He has an appointment with the NP for medication management later this week. Stacy states that she has noticed changes in his demeanor and mood, changes for the better. He is easier to talk to, without becoming easily agitated or angered. Communication has improved between the patient and his wife. Call ended as patient's wife needed to return to work. Will continue to follow the patient and assess further needs.

## 2021-11-23 ENCOUNTER — OFFICE VISIT (OUTPATIENT)
Dept: BEHAVIORAL/MENTAL HEALTH CLINIC | Age: 41
End: 2021-11-23
Payer: COMMERCIAL

## 2021-11-23 VITALS
BODY MASS INDEX: 29.88 KG/M2 | TEMPERATURE: 98.1 F | SYSTOLIC BLOOD PRESSURE: 134 MMHG | HEIGHT: 67 IN | WEIGHT: 190.4 LBS | HEART RATE: 55 BPM | DIASTOLIC BLOOD PRESSURE: 84 MMHG | OXYGEN SATURATION: 98 % | RESPIRATION RATE: 17 BRPM

## 2021-11-23 DIAGNOSIS — F90.9 ATTENTION DEFICIT HYPERACTIVITY DISORDER (ADHD), UNSPECIFIED ADHD TYPE: ICD-10-CM

## 2021-11-23 DIAGNOSIS — F31.10 BIPOLAR DISORDER, MOST RECENT EPISODE MANIC (HCC): Primary | ICD-10-CM

## 2021-11-23 PROCEDURE — 99213 OFFICE O/P EST LOW 20 MIN: CPT | Performed by: NURSE PRACTITIONER

## 2021-11-23 RX ORDER — CLONAZEPAM 1 MG/1
1 TABLET ORAL 2 TIMES DAILY
Qty: 28 TABLET | Refills: 0 | Status: SHIPPED | OUTPATIENT
Start: 2021-11-23 | End: 2021-12-07

## 2021-11-23 RX ORDER — SERTRALINE HYDROCHLORIDE 50 MG/1
50 TABLET, FILM COATED ORAL DAILY
COMMUNITY
End: 2021-12-14 | Stop reason: SDUPTHER

## 2021-11-23 RX ORDER — OLANZAPINE 5 MG/1
5 TABLET ORAL
Qty: 14 TABLET | Refills: 0 | Status: SHIPPED | OUTPATIENT
Start: 2021-11-23 | End: 2021-12-07

## 2021-11-23 NOTE — PROGRESS NOTES
CHIEF COMPLAINT:  Eva Goel is a 42 y.o. male and was seen today for follow-up of psychiatric condition and psychotropic medication management. HPI:    Nano Chapman reports the following psychiatric symptoms:  depression, agitation and jorge and anxiety. The symptoms have been present for weeks, but symptoms are improving  and are of low severity. The symptoms occur less severely and less frequently. There are no precipitating or alleviating factors. With the exception of anxiety, being around crowds of people exacerbates anxiety     Visit Vitals  /84 (BP 1 Location: Left arm, BP Patient Position: Sitting, BP Cuff Size: Adult)   Pulse (!) 55   Temp 98.1 °F (36.7 °C) (Temporal)   Resp 17   Ht 5' 7\" (1.702 m)   Wt 86.4 kg (190 lb 6.4 oz)   SpO2 98%   BMI 29.82 kg/m²       REVIEW OF SYSTEMS:  Psychiatric:  normal, being treated for Bipolar Disorder and  Depression  Appetite:good   Sleep: improved     Side Effects:  none    MENTAL STATUS EXAM:   Sensorium  oriented to time, place and person   Relations cooperative   Appearance:  within normal Limits   Motor Behavior:  within normal limits   Speech:  normal pitch and normal volume   Thought Process: within normal limits   Thought Content free of delusions and free of hallucinations   Suicidal ideations none   Homicidal ideations none   Mood:  anxious and irritable   Affect:  mood-congruent   Memory recent  adequate   Memory remote:  adequate   Concentration:  adequate   Abstraction:  abstract   Insight:  fair   Reliability good   Judgment:  good     MEDICAL DECISION MAKING:  Problems addressed today:    ICD-10-CM ICD-9-CM    1. Bipolar disorder, most recent episode manic ([facility]  F31.10 296.40 OLANZapine (ZyPREXA) 5 mg tablet      clonazePAM (KlonoPIN) 1 mg tablet   2. Attention deficit hyperactivity disorder (ADHD), unspecified ADHD type  F90.9 314.01        Assessment:   Nano Chapman is responding to treatment, symptoms of jorge an depression are improving. He reports to outpatient face to face appointment with wife, and has given permission for wife to be involved in care and treatment plan. Currently he is responding well to treatment. He reports still having some irritability and anxiety, but symptoms are better. In public places and around crowds, patient experiences more anxiety that does not seem to improve with ATARAX. Patient has been taking it as prescribed, but doesn't feel it is effective. He denies any side effects and still wants to come off the Zyprexa. Will continue to tap[er over the next several weeks. We will keep the Lamictal the same. Discussed current treatment plan and we agreed to start 1 mg of Clonazepam twice daily prn for Severe anxiety and irritability during the Holidays as patient will be around more family. Benzo agreement has been signed and patient is made aware of risks, and side effects. Risk and benefits discussed. Patient acknowledged. He does have a therapist he is working with. Last saw her today. He denies SI/HI hallucinations and delusions. Plan:   1. Current Outpatient Medications   Medication Sig Dispense Refill    OLANZapine (ZyPREXA) 5 mg tablet Take 1 Tablet by mouth nightly for 14 days. 14 Tablet 0    clonazePAM (KlonoPIN) 1 mg tablet Take 1 Tablet by mouth two (2) times a day for 14 days. Max Daily Amount: 2 mg. Indications: anxiety and irritability 28 Tablet 0    sertraline (ZOLOFT) 50 mg tablet Take 50 mg by mouth daily.  lamoTRIgine (LaMICtal) 25 mg tablet Take 1 Tablet by mouth daily for 21 days, THEN 2 Tablets daily for 9 days. 39 Tablet 0    hydrOXYzine HCL (ATARAX) 50 mg tablet Take 1 Tablet by mouth three (3) times daily as needed for Anxiety for up to 30 days. Indications: anxious 90 Tablet 0    traZODone (DESYREL) 100 mg tablet Take 0.5 Tablets by mouth nightly as needed for Sleep (For insomnia).  Indications: insomnia associated with depression 30 Tablet 0    fluticasone (FLONASE) 50 mcg/actuation nasal spray 2 Sprays by Both Nostrils route daily for 90 days. Long term, three month supply  Indications: ALLERGIC RHINITIS 3 Bottle 3          medication changes: Tapering off Zyprexa. Start 5 mg, start Clonazepam  MG twice daily prn. Continue Zoloft 50 mg daily. Lamictal 25 mg and trazodone if needed for sleep. 2.  Counseling and coordination of care including instructions for treatment, risks/benefits, risk factor reduction and patient/family education. He agrees with the plan. Patient instructed to call with any side effects, questions or issues.        Total time spent with patient 30 minutes     Archie Sullivan NP  11/23/2021

## 2021-11-30 ENCOUNTER — PATIENT OUTREACH (OUTPATIENT)
Dept: OTHER | Age: 41
End: 2021-11-30

## 2021-11-30 NOTE — PROGRESS NOTES
Incoming call from patient's spouse, Zelda Montalvo. Verified two patient identifiers. Patient and his wife just returned from a therapy session, this was the first one that the patient had alone with the therapist. They both felt that is was productive. Patient has therapy every Tuesday. Per wife, she was concerned that she learned today that the therapist he is seeing and really likes is not in network with [facility] [employer] [place] Patient is wondering if she will be in network with new insurance, come Jan. 2022. Patient also had an appointment for medication management. No new medications and no medication adjustments. Patient took his last dose of Zyprexa last evening. He is now taking just Lamictal and Lexapro and is much more comfortable taking two medications. Patient's wife informed this ECM that they traveled to [place] for the Thanksgiving holiday and it went very well. Patient's mood and reactions are much more stable and was noted by family members to be much improved. Call ended as Stacy heard yelling and noises in the home and went to identify the cause. This ECM will identify resources for the patient to confirm in network providers with the new insurance as well as identify possible alternative options for therapy. Will follow up in the AM with this information.

## 2021-12-01 ENCOUNTER — PATIENT OUTREACH (OUTPATIENT)
Dept: OTHER | Age: 41
End: 2021-12-01

## 2021-12-01 NOTE — PROGRESS NOTES
Telephonic outreach to patient's wife to provide the information requested during phone call yesterday. LVM with the requested information, a phone number to call for the new insurance-[employer]. Patient and his wife are trying to determine if the current counselor will be in network in the coming year. Encouraged the patient and his wife to call if they do not see the counselor in network, as we will begin a search for an in network counselor. Will follow up with patient in one week.

## 2021-12-02 ENCOUNTER — PATIENT OUTREACH (OUTPATIENT)
Dept: OTHER | Age: 41
End: 2021-12-02

## 2021-12-02 NOTE — PROGRESS NOTES
Incoming call from patient's wife, Nancy Ely. Verified two patient identifiers. She had questions related to the previous conversation, the day before. This ECM had provided a phone number for [employer] to determine provider coverage for upcoming insurance, that will start on January 1, 2022. She voiced a negative experience, but was willing to reach out again this afternoon to see if she is able to get any answers. She did obtain an out of network waiver and stated that she had completed most of it. Reviewed the process of OON waivers and the chances of getting authorization, as there are other providers in the immediate area. She voiced wanted to move forward with the process of the waiver. Will follow up next week to discuss progress and determination of her call to [address]

## 2021-12-08 ENCOUNTER — PATIENT OUTREACH (OUTPATIENT)
Dept: OTHER | Age: 41
End: 2021-12-08

## 2021-12-08 NOTE — PROGRESS NOTES
Telephonic outreach to patient and his wife Stacy. Verified two patient identifiers. Per Stacy, the patient is continuing to improve with his concentration levels and mood stabilization. He is continuing his therapy with his current counselor, though it appears that she may not be on the insurance panel starting in January. Stacy states that that she will continue to see him for free until the insurance piece can be figured out. Stacy states that her  is improving with his motivation and concentration. Today he is organizing toys and plans to work on his \"homework\" given to him by his counselor. Slight improvements are noted, just recovering at a slow pace, which patient's wife finds frustrating. Will outreach the patient and his wife next week, for any updates on whether the counselor will be covered on the insurance or is a new provider will be necessary.

## 2021-12-14 ENCOUNTER — OFFICE VISIT (OUTPATIENT)
Dept: BEHAVIORAL/MENTAL HEALTH CLINIC | Age: 41
End: 2021-12-14
Payer: COMMERCIAL

## 2021-12-14 VITALS
HEIGHT: 67 IN | TEMPERATURE: 98.1 F | BODY MASS INDEX: 29.85 KG/M2 | WEIGHT: 190.2 LBS | SYSTOLIC BLOOD PRESSURE: 109 MMHG | OXYGEN SATURATION: 98 % | HEART RATE: 73 BPM | DIASTOLIC BLOOD PRESSURE: 69 MMHG | RESPIRATION RATE: 17 BRPM

## 2021-12-14 DIAGNOSIS — F32.A DEPRESSION, UNSPECIFIED DEPRESSION TYPE: Primary | ICD-10-CM

## 2021-12-14 DIAGNOSIS — F31.10 BIPOLAR DISORDER, MOST RECENT EPISODE MANIC (HCC): ICD-10-CM

## 2021-12-14 PROCEDURE — 99214 OFFICE O/P EST MOD 30 MIN: CPT | Performed by: NURSE PRACTITIONER

## 2021-12-14 RX ORDER — SERTRALINE HYDROCHLORIDE 50 MG/1
50 TABLET, FILM COATED ORAL DAILY
Qty: 30 TABLET | Refills: 1 | Status: SHIPPED | OUTPATIENT
Start: 2021-12-14 | End: 2021-12-20 | Stop reason: SDUPTHER

## 2021-12-14 RX ORDER — LAMOTRIGINE 25 MG/1
50 TABLET ORAL DAILY
Qty: 60 TABLET | Refills: 0 | Status: SHIPPED | OUTPATIENT
Start: 2021-12-14 | End: 2021-12-20 | Stop reason: SDUPTHER

## 2021-12-14 NOTE — PROGRESS NOTES
Chief Complaint   Patient presents with    Follow-up     Visit Vitals  /69 (BP 1 Location: Left arm, BP Patient Position: Sitting, BP Cuff Size: Large adult)   Pulse 73   Temp 98.1 °F (36.7 °C) (Temporal)   Resp 17   Ht 5' 7\" (1.702 m)   Wt 86.3 kg (190 lb 3.2 oz)   SpO2 98%   BMI 29.79 kg/m²     Prior to Admission medications    Medication Sig Start Date End Date Taking? Authorizing Provider   sertraline (ZOLOFT) 50 mg tablet Take 50 mg by mouth daily. Yes Provider, Historical   traZODone (DESYREL) 100 mg tablet Take 0.5 Tablets by mouth nightly as needed for Sleep (For insomnia). Indications: insomnia associated with depression 10/26/21  Yes Chema Esquivel MD   fluticasone [facility]) 50 mcg/actuation nasal spray 2 Sprays by Both Nostrils route daily for 90 days.  Long term, three month supply  Indications: ALLERGIC RHINITIS 2/24/17 5/25/17  Jennifer Barrow NP

## 2021-12-14 NOTE — PROGRESS NOTES
CHIEF COMPLAINT:  Tessy Husain is a 42 y.o. male and was seen today for follow-up of psychiatric condition and psychotropic medication management. HPI:    Shamar Horner reports the following psychiatric symptoms:  depression, agitation and jorge and anxiety. The symptoms have been present for weeks, but symptoms are improving  and are of low severity. The symptoms occur less severely and less frequently. Patient is currently taking 25 mg of Lamictal and 50 mg of Zoloft, and Atarax as needed for anxiety. Patient reports he has not had to use the clonazepam.     There are no precipitating or alleviating factors. With the exception of anxiety, being around crowds of people exacerbates anxiety. FAMILY/SOCIAL HX:   Is at home with son and not currently working   Has support of wife who is very active in his care       REVIEW OF SYSTEMS:  Psychiatric symptoms being monitored for:  being treated for BD,    Appetite:good   Sleep: good   Neuro: denies       Visit Vitals  /69 (BP 1 Location: Left arm, BP Patient Position: Sitting, BP Cuff Size: Large adult)   Pulse 73   Temp 98.1 °F (36.7 °C) (Temporal)   Resp 17   Ht 5' 7\" (1.702 m)   Wt 86.3 kg (190 lb 3.2 oz)   SpO2 98%   BMI 29.79 kg/m²       Side Effects:  none    MENTAL STATUS EXAM:   Sensorium  oriented to time, place and person   Relations cooperative   Appearance:  within normal Limits   Motor Behavior:  within normal limits   Speech:  normal pitch and normal volume   Thought Process: within normal limits   Thought Content free of delusions and free of hallucinations   Suicidal ideations none   Homicidal ideations none   Mood:  euthymic   Affect:  euthymic   Memory recent  adequate   Memory remote:  adequate   Concentration:  adequate   Abstraction:  abstract   Insight:  good   Reliability good   Judgment:  good     MEDICAL DECISION MAKING:  Problems addressed today:    ICD-10-CM ICD-9-CM    1. Depression, unspecified depression type  F32. A 311    2. Bipolar disorder, most recent episode manic ([facility])  F31.10 296.40 lamoTRIgine (LaMICtal) 25 mg tablet      sertraline (ZOLOFT) 50 mg tablet         Assessment:   Cesilia Britt is a 42 y.o. male here with his wife who he has given permission to be present and involved in his care. He  responding to treatment. Symptoms are less in severity and less in occurrence. During  inteview patient is alert and oriented, cooperative, dressed appropriately for season and has good eye contact. Patient reports for a week after stopping Zyprexa he was more tired, but he states he is more normal. He reports feeling better overall, but still struggles with anxiety and some depressive symptoms, as well as agitation. He is currently seeing therapist weekly and feels it is effective and they have a great rapport. Patient states he recently found out she doesn't take his insurance. Patient is responding, so it would be beneficial if he can stay with therapist.  Discussed current medications and dosages. Reviewed treatment goals and target symptoms to monitor for. We will increase Lamictal to 50 mg for 2 to 3 weeks and increase to 100 mg. If he continues to struggle with anxiety and depression, we will consider switching from Zoloft to [place]. He denies SI/HI hallucinations and delusions. Plan:   1. Current Outpatient Medications   Medication Sig Dispense Refill    lamoTRIgine (LaMICtal) 25 mg tablet Take 2 Tablets by mouth daily for 30 days. 60 Tablet 0    sertraline (ZOLOFT) 50 mg tablet Take 1 Tablet by mouth daily for 60 days. 30 Tablet 1    traZODone (DESYREL) 100 mg tablet Take 0.5 Tablets by mouth nightly as needed for Sleep (For insomnia). Indications: insomnia associated with depression 30 Tablet 0    fluticasone (FLONASE) 50 mcg/actuation nasal spray 2 Sprays by Both Nostrils route daily for 90 days.  Long term, three month supply  Indications: ALLERGIC RHINITIS 3 Bottle 3          medication changes made today:  Increase Lamictal to 50 mg daily, continue Zoloft 50 mg, and Atarax 50 mg for anxiety as needed, and trazodone 100 mg at night for sleep . 2.  Counseling and coordination of care including instructions for treatment, risks/benefits, risk factor reduction and patient/family education. He agrees with the plan. Patient instructed to call with any side effects, questions or issues. 3.    Follow-up and Dispositions    · Return in about 4 weeks (around 1/11/2022) for medication management .               12/14/2021  Keyanna Resendez NP

## 2021-12-20 ENCOUNTER — TELEPHONE (OUTPATIENT)
Dept: BEHAVIORAL/MENTAL HEALTH CLINIC | Age: 41
End: 2021-12-20

## 2021-12-20 DIAGNOSIS — F31.10 BIPOLAR DISORDER, MOST RECENT EPISODE MANIC (HCC): ICD-10-CM

## 2021-12-20 RX ORDER — SERTRALINE HYDROCHLORIDE 50 MG/1
50 TABLET, FILM COATED ORAL DAILY
Qty: 30 TABLET | Refills: 1 | Status: SHIPPED | OUTPATIENT
Start: 2021-12-20 | End: 2022-02-18

## 2021-12-20 RX ORDER — TRAZODONE HYDROCHLORIDE 100 MG/1
50 TABLET ORAL
Qty: 30 TABLET | Refills: 0 | Status: SHIPPED | OUTPATIENT
Start: 2021-12-20 | End: 2022-03-08 | Stop reason: SINTOL

## 2021-12-20 RX ORDER — LAMOTRIGINE 25 MG/1
50 TABLET ORAL DAILY
Qty: 60 TABLET | Refills: 0 | Status: SHIPPED | OUTPATIENT
Start: 2021-12-20 | End: 2022-01-03

## 2021-12-20 NOTE — TELEPHONE ENCOUNTER
Patient needs medications sent to [address] [place] [address] due to cost.  Spouse will also try calling pharmacy to have them transfer the prescriptions.

## 2021-12-28 ENCOUNTER — PATIENT OUTREACH (OUTPATIENT)
Dept: OTHER | Age: 41
End: 2021-12-28

## 2021-12-28 NOTE — PROGRESS NOTES
Telephonic outreach to patient and his wife to follow up with progress and assess needs. Verified two patient identifiers. Patient diagnosed with bipolar. Per his wife, he had another manic episode right before [place], however was much more prepared to handle it. They feel as if the medications have really helped stabilize his moods. He is currently taking Lamictal 50mg and Zoloft 50mg, with a plan to double the Lamictal in the next few days. Patient's wife Stacy, feels that sleep or lack there of really makes an impact on his behaviors and moods. The night before his episode he did sleep well, slept for 11 hours the next day and mood was much more stable. He is using Atarax more often to help stabilize his anxiety. They have sent in the [employer] waivers for this therapist, who is not in network, but willing to see the patient pro-quentin. They have worked out a deal to pay the therapist the self pay rate every other visit, with hopes that the therapist can get on board with the insurance or will be approved by the insurance via the [employer]. Another OON network form will be completed after January 1 with Walter. Patient does see his therapist weekly and has his next medication management appointment with Brittny Vaughn on 1/16. Patient is trying to build his support system, however still has some difficulties interacting with others. Discussed MICHAEL, will send information via email.

## 2021-12-30 ENCOUNTER — PATIENT OUTREACH (OUTPATIENT)
Dept: OTHER | Age: 41
End: 2021-12-30

## 2021-12-30 NOTE — PROGRESS NOTES
Outreach to patient and his wife to obtain the patient's wife's email address. Patient was able to provide the email as: [email or web address]  Will send discussed information regarding MICHAEL.

## 2022-01-03 DIAGNOSIS — F31.10 BIPOLAR DISORDER, MOST RECENT EPISODE MANIC (HCC): ICD-10-CM

## 2022-01-03 RX ORDER — LAMOTRIGINE 100 MG/1
100 TABLET ORAL DAILY
Qty: 30 TABLET | Refills: 0 | Status: SHIPPED | OUTPATIENT
Start: 2022-01-03 | End: 2022-01-12 | Stop reason: DRUGHIGH

## 2022-01-03 NOTE — PROGRESS NOTES
Spoke to Alex Cancino's spouse, patient had breakthrough jorge, but she reports this time wasn't as severe. Patient is sleeping now. Discussed Lamictal increase. Prescription sent to [facility], [address], [address] [facility] [address]   Phone:  [phone]  Fax:  [phone]

## 2022-01-12 ENCOUNTER — VIRTUAL VISIT (OUTPATIENT)
Dept: BEHAVIORAL/MENTAL HEALTH CLINIC | Age: 42
End: 2022-01-12
Payer: COMMERCIAL

## 2022-01-12 DIAGNOSIS — F90.9 ATTENTION DEFICIT HYPERACTIVITY DISORDER (ADHD), UNSPECIFIED ADHD TYPE: ICD-10-CM

## 2022-01-12 DIAGNOSIS — F31.10 BIPOLAR DISORDER, MOST RECENT EPISODE MANIC (HCC): Primary | ICD-10-CM

## 2022-01-12 PROCEDURE — 99214 OFFICE O/P EST MOD 30 MIN: CPT | Performed by: NURSE PRACTITIONER

## 2022-01-12 RX ORDER — LAMOTRIGINE 200 MG/1
200 TABLET ORAL DAILY
Qty: 30 TABLET | Refills: 0 | Status: SHIPPED | OUTPATIENT
Start: 2022-01-17 | End: 2022-02-08 | Stop reason: SDUPTHER

## 2022-01-12 NOTE — PROGRESS NOTES
CHIEF COMPLAINT:  Bipin Belcher is a 42 y.o. male and was seen today for follow-up of psychiatric condition and psychotropic medication management. HPI:    Alex reports the following psychiatric symptoms:  depression, agitation and jorge and anxiety.  The symptoms have been present for weeks, but symptoms are improving and are of lmoderate severity. The symptoms occur less severely and less frequently. Patient reports a recent manic episode right before [place], this time it was less in severity. Patient has also been hypomanic for the past two days. Denies SI/HI/AH/VH and delusions. No precipitating factors or alleviating. Patient consents to virtual appointments. FAMILY/SOCIAL HX:   Psychosocial stressors     REVIEW OF SYSTEMS:  Psychiatric symptoms being monitored for:  jorge   Appetite:good   Sleep: improved   Neuro: denies     There were no vitals taken for this visit. Side Effects:  none    MENTAL STATUS EXAM:   Sensorium  oriented to time, place and person   Relations cooperative   Appearance:  within normal Limits   Motor Behavior:  within normal limits   Speech:  hyperverbal   Thought Process: flight of ideas   Thought Content free of delusions and free of hallucinations   Suicidal ideations none   Homicidal ideations none   Mood:  euphoric   Affect:  euphoric   Memory recent  adequate   Memory remote:  adequate   Concentration:  adequate   Abstraction:  abstract   Insight:  fair   Reliability good   Judgment:  good     MEDICAL DECISION MAKING:  Problems addressed today:    ICD-10-CM ICD-9-CM    1. Bipolar disorder, most recent episode manic ([facility])  F31.10 296.40 lamoTRIgine (LaMICtal) 200 mg tablet   2. Attention deficit hyperactivity disorder (ADHD), unspecified ADHD type  F90.9 314.01            Assessment:   Sunny [place] is responding to treatment. Symptoms are less in severity and less in occurrence however patient does report a recent manic episode and hypomania for the past two days.  During interview patient is hyper verbal and and slightly expansive. Discussed current medications and dosages. Discussed positive coping strategies. Discussed alternative treatment plans, and risks and benefits. Reviewed treatment goals and target symptoms to monitor for. Plan:   1. Current Outpatient Medications   Medication Sig Dispense Refill    [START ON 1/17/2022] lamoTRIgine (LaMICtal) 200 mg tablet Take 1 Tablet by mouth daily. 30 Tablet 0    sertraline (ZOLOFT) 50 mg tablet Take 1 Tablet by mouth daily for 60 days. 30 Tablet 1    traZODone (DESYREL) 100 mg tablet Take 0.5 Tablets by mouth nightly as needed for Sleep (For insomnia). Indications: insomnia associated with depression 30 Tablet 0    fluticasone (FLONASE) 50 mcg/actuation nasal spray 2 Sprays by Both Nostrils route daily for 90 days. Long term, three month supply  Indications: ALLERGIC RHINITIS 3 Bottle 3          medication changes made today: increase Lamictal 200 mg daily     2. Counseling and coordination of care including instructions for treatment, risks/benefits, risk factor reduction and patient/family education. He agrees with the plan. Patient instructed to call with any side effects, questions or issues. Elisabeth Lili, was evaluated through a synchronous (real-time) audio-video encounter. The patient (or guardian if applicable) is aware that this is a billable service. Verbal consent to proceed has been obtained within the past 12 months. The visit was conducted pursuant to the emergency declaration under the [address] [place] [address], [address] [place] [address] authority and the [employer] Act. Patient identification was verified, and a caregiver was present when appropriate. The patient was located in a state where the provider was credentialed to provide care. An electronic signature was used to authenticate this note.   -- Mickey Rivers NP 1/12/2022  Bebe Ordonez NP

## 2022-01-17 ENCOUNTER — PATIENT OUTREACH (OUTPATIENT)
Dept: OTHER | Age: 42
End: 2022-01-17

## 2022-01-17 NOTE — PROGRESS NOTES
Telephonic outreach to patient and his wife. Patient's wife, Stacy answered the call. Verified two patient identifiers. (Patient has requested this ECM speak to his wife regarding his Bipolar disorder and treatment). Stacy informed this ECM that the patient was sleeping, as he had another increase in his Lamictal dosage. He is currently at 125 mg, with the ideal dose being 200 mg daily. Patient is slowly increasing the medication, so that he can adjust to the changes. By the time he has his next medication management appointmentThe increase causing nausea and sleepiness. Patient's wife feels that his depression has improved. She stated that he had another \"episode\" last week, but they have become more manageable. She feels that his highs are not as high and his lows are not as low. Patient would like to stop taking the Zoloft. Encouraged the patient and his wife to discuss this possibility at his next appointment. Patient continues to see his therapist weekly and is going to [employer] groups and activities in the evening during the week. Patient and wife feel well supported. Stacy plans to complete the OON waiver for the therapist, whom they are paying every other week out of pocket. The other week, the sessions are at no cost.   Will await an outreach regarding the OON waiver and will follow up with patient and his wife in 2 weeks.

## 2022-02-08 ENCOUNTER — VIRTUAL VISIT (OUTPATIENT)
Dept: BEHAVIORAL/MENTAL HEALTH CLINIC | Age: 42
End: 2022-02-08
Payer: COMMERCIAL

## 2022-02-08 ENCOUNTER — PATIENT OUTREACH (OUTPATIENT)
Dept: OTHER | Age: 42
End: 2022-02-08

## 2022-02-08 DIAGNOSIS — F90.9 ATTENTION DEFICIT HYPERACTIVITY DISORDER (ADHD), UNSPECIFIED ADHD TYPE: ICD-10-CM

## 2022-02-08 DIAGNOSIS — F32.A DEPRESSION, UNSPECIFIED DEPRESSION TYPE: ICD-10-CM

## 2022-02-08 DIAGNOSIS — F31.10 BIPOLAR DISORDER, MOST RECENT EPISODE MANIC (HCC): Primary | ICD-10-CM

## 2022-02-08 PROCEDURE — 99214 OFFICE O/P EST MOD 30 MIN: CPT | Performed by: NURSE PRACTITIONER

## 2022-02-08 RX ORDER — BUPROPION HYDROCHLORIDE 150 MG/1
150 TABLET ORAL DAILY
Qty: 30 TABLET | Refills: 0 | Status: SHIPPED | OUTPATIENT
Start: 2022-02-08 | End: 2022-03-07

## 2022-02-08 RX ORDER — LAMOTRIGINE 200 MG/1
200 TABLET ORAL DAILY
Qty: 30 TABLET | Refills: 1 | Status: SHIPPED | OUTPATIENT
Start: 2022-02-08 | End: 2022-03-08 | Stop reason: SDUPTHER

## 2022-02-09 ENCOUNTER — PATIENT OUTREACH (OUTPATIENT)
Dept: OTHER | Age: 42
End: 2022-02-09

## 2022-02-09 NOTE — PROGRESS NOTES
Telephonic outreach to patient's wife to follow up. Attempted to reach patient yesterday and his wife requested that this ECM attempt to contact him today. Patient's wife answered the call and verified two patient identifiers. Patient had a virtual session with his NP and his therapist on 2/8. They are pleased with the Lamictal dose and the effects of it. Patient and wife feel that it is really helping. Patient does not like the side effects of the Zoloft and Trazadone. He was taken off of both of those medications. He was started on Wellbutrin and Unisom for sleep. Patient's wife was called away for a meeting. Will outreach for additional assessment next week.

## 2022-02-24 ENCOUNTER — PATIENT OUTREACH (OUTPATIENT)
Dept: OTHER | Age: 42
End: 2022-02-24

## 2022-03-04 DIAGNOSIS — F32.A DEPRESSION, UNSPECIFIED DEPRESSION TYPE: ICD-10-CM

## 2022-03-07 RX ORDER — BUPROPION HYDROCHLORIDE 150 MG/1
TABLET ORAL
Qty: 30 TABLET | Refills: 0 | Status: SHIPPED | OUTPATIENT
Start: 2022-03-07 | End: 2022-03-08

## 2022-03-08 ENCOUNTER — VIRTUAL VISIT (OUTPATIENT)
Dept: BEHAVIORAL/MENTAL HEALTH CLINIC | Age: 42
End: 2022-03-08
Payer: COMMERCIAL

## 2022-03-08 DIAGNOSIS — F90.9 ATTENTION DEFICIT HYPERACTIVITY DISORDER (ADHD), UNSPECIFIED ADHD TYPE: ICD-10-CM

## 2022-03-08 DIAGNOSIS — F32.A DEPRESSION, UNSPECIFIED DEPRESSION TYPE: ICD-10-CM

## 2022-03-08 DIAGNOSIS — F31.10 BIPOLAR DISORDER, MOST RECENT EPISODE MANIC (HCC): Primary | ICD-10-CM

## 2022-03-08 PROCEDURE — 99214 OFFICE O/P EST MOD 30 MIN: CPT | Performed by: NURSE PRACTITIONER

## 2022-03-08 RX ORDER — BUPROPION HYDROCHLORIDE 300 MG/1
300 TABLET ORAL DAILY
Qty: 30 TABLET | Refills: 1 | Status: SHIPPED | OUTPATIENT
Start: 2022-03-08 | End: 2022-04-14 | Stop reason: SDUPTHER

## 2022-03-08 RX ORDER — LAMOTRIGINE 200 MG/1
200 TABLET ORAL DAILY
Qty: 30 TABLET | Refills: 1 | Status: SHIPPED | OUTPATIENT
Start: 2022-03-08 | End: 2022-04-14 | Stop reason: SDUPTHER

## 2022-03-08 NOTE — PROGRESS NOTES
CHIEF COMPLAINT:  Rohan Romero is a 42 y.o. male and was seen today for follow-up of psychiatric condition and psychotropic medication management. HPI:    Wei Smith reports the following psychiatric symptoms by hx:  depression, anxiety and jorge. Overall symptoms have been present for years. Currently symptoms of moderate severity. The symptoms occur intermittently. Pt reports medications are effective. Met with for appt today via telemedicine  to review current treatment plan. Patient consents to virtual appointment. FAMILY/SOCIAL HX:   Psychosocial Stressors     REVIEW OF SYSTEMS:  Psychiatric symptoms being monitored for:  depression, ADHD, Bipolar Disorder   Appetite:good   Sleep: good   Neuro: denies     There were no vitals taken for this visit. Side Effects:  none    MENTAL STATUS EXAM:   Sensorium  oriented to time, place and person   Relations cooperative   Appearance:  within normal Limits   Motor Behavior:  within normal limits   Speech:  normal pitch and normal volume   Thought Process: within normal limits   Thought Content free of delusions and free of hallucinations   Suicidal ideations none   Homicidal ideations none   Mood:  \"upbeat\"    Affect:  euthymic   Memory recent  adequate   Memory remote:  adequate   Concentration:  impaired   Abstraction:  abstract   Insight:  good   Reliability good   Judgment:  good     MEDICAL DECISION MAKING:  Problems addressed today:    ICD-10-CM ICD-9-CM    1. Bipolar disorder, most [facility] episode [facility] ([facility]  F31.10 296.40    2. Attention deficit hyperactivity disorder (ADHD), unspecified ADHD type  F90.9 314.01            Assessment:   Wei Smith is responding to treatment. Symptoms are less in occurrence and less in severity. Patient feels his symptoms are improving and he is closer to baseline. Spouse thinks he is still struggling with depression. Patient admits he may still be struggling with depressive symptoms.  Patient reports he has been having some car sickness. He upbruptly stopped trazodone and possibly had serotonin withdrawal. Patient feels better now, but continues to have car sickness. Encouraged patient to contact PCP and get an appointment. He reports he is sleeping without Trazodone. Discussed current medications and dosages. Discussed music therapy. Reviewed treatment goals and target symptoms to monitor for. Will increase Wellbutrin and continue Lamictal. Patient denies SI/HI/AH/VH jorge, and delusions. Plan:   1. Current Outpatient Medications   Medication Sig Dispense Refill    buPROPion XL (WELLBUTRIN XL) 150 mg tablet TAKE ONE TABLET BY MOUTH ONE TIME DAILY 30 Tablet 0    lamoTRIgine (LaMICtal) 200 mg tablet Take 1 Tablet by mouth daily. 30 Tablet 1    traZODone (DESYREL) 100 mg tablet Take 0.5 Tablets by mouth nightly as needed for Sleep (For insomnia). Indications: insomnia associated with depression 30 Tablet 0    fluticasone (FLONASE) 50 mcg/actuation nasal spray 2 Sprays by Both Nostrils route daily for 90 days. Long term, three month supply  Indications: ALLERGIC RHINITIS 3 Bottle 3          medication changes made today: Wellbutrin 300 mg XL daily     2. Counseling and coordination of care including instructions for treatment, risks/benefits, risk factor reduction and patient/family education. He agrees with the plan. Patient instructed to call with any side effects, questions or issues. Marcus Byrd, was evaluated through a synchronous (real-time) audio-video encounter. The patient (or guardian if applicable) is aware that this is a billable service, which includes applicable co-pays. Verbal consent to proceed has been obtained. The visit was conducted pursuant to the emergency declaration under the [facility], [address] [place] [address] authority and the [employer] and [employer] Act.   Patient identification was verified, and a caregiver was present when appropriate. The patient was located at home in a state where the provider was licensed to provide care. An electronic signature was used to authenticate this note.   -- Maikol Heller NP     3/8/2022

## 2022-03-09 ENCOUNTER — PATIENT OUTREACH (OUTPATIENT)
Dept: OTHER | Age: 42
End: 2022-03-09

## 2022-03-09 NOTE — PROGRESS NOTES
Telephonic outreach to patient and his wife, Elif Agudelo. Left a discreet VM with a request for a return call. Will continue to outreach patient and his wife.

## 2022-03-19 PROBLEM — F39 UNSPECIFIED MOOD (AFFECTIVE) DISORDER (HCC): Status: ACTIVE | Noted: 2021-10-22

## 2022-03-19 PROBLEM — F31.10 BIPOLAR DISORDER, MOST RECENT EPISODE MANIC (HCC): Status: ACTIVE | Noted: 2021-11-11

## 2022-03-23 ENCOUNTER — TELEPHONE (OUTPATIENT)
Dept: FAMILY MEDICINE CLINIC | Age: 42
End: 2022-03-23

## 2022-03-23 NOTE — TELEPHONE ENCOUNTER
Attempt to reach pt unsuccessful. LVM for patient to [facility] to offer an earlier appt.

## 2022-03-23 NOTE — TELEPHONE ENCOUNTER
----- Message from George Reece sent at 3/23/2022  2:23 PM EDT -----  Subject: Message to Provider    QUESTIONS  Information for Provider? Patient scheduled in person appt for 4/7 for   possible ear infection, in the system virtual appts where available with   provider TASHA sooner, he would like to know can a virtual visit be   scheduled with this concern? Please contact patient to advise.   ---------------------------------------------------------------------------  --------------  CALL BACK INFO  What is the best way for the office to contact you? OK to leave message on   voicemail  Preferred Call Back Phone Number? [phone]  ---------------------------------------------------------------------------  --------------  SCRIPT ANSWERS  Relationship to Patient?  Self

## 2022-03-24 ENCOUNTER — VIRTUAL VISIT (OUTPATIENT)
Dept: FAMILY MEDICINE CLINIC | Age: 42
End: 2022-03-24
Payer: COMMERCIAL

## 2022-03-24 DIAGNOSIS — H66.002 NON-RECURRENT ACUTE SUPPURATIVE OTITIS MEDIA OF LEFT EAR WITHOUT SPONTANEOUS RUPTURE OF TYMPANIC MEMBRANE: Primary | ICD-10-CM

## 2022-03-24 DIAGNOSIS — H61.22 IMPACTED CERUMEN OF LEFT EAR: ICD-10-CM

## 2022-03-24 DIAGNOSIS — J30.1 SEASONAL ALLERGIC RHINITIS DUE TO POLLEN: ICD-10-CM

## 2022-03-24 PROCEDURE — 69209 REMOVE IMPACTED EAR WAX UNI: CPT | Performed by: FAMILY MEDICINE

## 2022-03-24 PROCEDURE — 99213 OFFICE O/P EST LOW 20 MIN: CPT | Performed by: FAMILY MEDICINE

## 2022-03-24 RX ORDER — AMOXICILLIN AND CLAVULANATE POTASSIUM 875; 125 MG/1; MG/1
1 TABLET, FILM COATED ORAL EVERY 12 HOURS
Qty: 14 TABLET | Refills: 0 | Status: SHIPPED | OUTPATIENT
Start: 2022-03-24 | End: 2022-03-31

## 2022-03-24 RX ORDER — FLUTICASONE PROPIONATE 50 MCG
2 SPRAY, SUSPENSION (ML) NASAL DAILY
Qty: 2 EACH | Refills: 1 | Status: SHIPPED | OUTPATIENT
Start: 2022-03-24 | End: 2022-06-22

## 2022-03-24 RX ORDER — CETIRIZINE HCL 10 MG
10 TABLET ORAL DAILY
Qty: 90 TABLET | Refills: 1 | Status: SHIPPED | OUTPATIENT
Start: 2022-03-24 | End: 2022-08-30 | Stop reason: SDUPTHER

## 2022-03-24 RX ORDER — FLUTICASONE PROPIONATE 50 MCG
2 SPRAY, SUSPENSION (ML) NASAL DAILY
Qty: 2 EACH | Refills: 1 | Status: SHIPPED | OUTPATIENT
Start: 2022-03-24 | End: 2022-03-24 | Stop reason: SDUPTHER

## 2022-03-24 NOTE — PATIENT INSTRUCTIONS
Ear Infection (Otitis Media): Care Instructions  Overview     An ear infection may start with a cold and affect the middle ear (otitis media). It can hurt a lot. Most ear infections clear up on their own in a couple of days and do not need antibiotics. Also, antibiotics do not work against viruses, which may be the cause of your infection. Regular doses of pain relievers are the best way to reduce your fever and help you feel better. Follow-up care is a key part of your treatment and safety. Be sure to make and go to all appointments, and call your doctor if you are having problems. It's also a good idea to know your test results and keep a list of the medicines you take. How can you care for yourself at home? · Take pain medicines exactly as directed. ? If the doctor gave you a prescription medicine for pain, take it as prescribed. ? If you are not taking a prescription pain medicine, take an over-the-counter medicine, such as acetaminophen (Tylenol), ibuprofen (Advil, Motrin), or naproxen (Aleve). Read and follow all instructions on the label. ? Do not take two or more pain medicines at the same time unless the doctor told you to. Many pain medicines have acetaminophen, which is Tylenol. Too much acetaminophen (Tylenol) can be harmful. · Plan to take a full dose of pain reliever before bedtime. Getting enough sleep will help you get better. · Try a warm, moist washcloth on the ear. It may help relieve pain. · If your doctor prescribed antibiotics, take them as directed. Do not stop taking them just because you feel better. You need to take the full course of antibiotics. When should you call for help? Call your doctor now or seek immediate medical care if:    · You have new or increasing ear pain.     · You have new or increasing pus or blood draining from your ear.     · You have a fever with a stiff neck or a severe headache.    Watch closely for changes in your health, and be sure to contact your doctor if:    · You have new or worse symptoms.     · You are not getting better after taking an antibiotic for 2 days. Where can you learn more? Go to http://www.gray.com/  Enter [id number] in the search box to learn more about \"Ear Infection (Otitis Media): Care Instructions. \"  Current as of: September 8, 2021               Content Version: 13.2  © 2006-2022 [employer]. Care instructions adapted under license by [employer] (which disclaims liability or warranty for this information). If you have questions about a medical condition or this instruction, always ask your healthcare professional. [place] [address] any warranty or liability for your use of this information.

## 2022-03-24 NOTE — PROGRESS NOTES
Lisandro Parsons (: 1980) is a 42 y.o. male, established patient, here for evaluation of the following chief complaint(s):  Ear Pain (Left Ear pain. X 2 weeks ) and Other (BP:  108 /72      P: 82  So2: 96%    Res: 14   Tem: 98.0)         ASSESSMENT/PLAN:  Below is the assessment and plan developed based on review of pertinent history, physical exam, labs, studies, and medications. 1. Non-recurrent acute suppurative otitis media of left ear without spontaneous rupture of tympanic membrane  Add antibiotic  Push fluids  -     amoxicillin-clavulanate (AUGMENTIN) 875-125 mg per tablet; Take 1 Tablet by mouth every twelve (12) hours for 7 days. , Normal, Disp-14 Tablet, R-0    2. Seasonal allergic rhinitis due to pollen  Start flonase and zyrtec. Patient is very sensitive to oral steroids but has used flonase in the past and it was very well tolerated   -     fluticasone propionate (FLONASE) 50 mcg/actuation nasal spray; 2 Sprays by Both Nostrils route daily for 90 days. Long term, three month supply  Indications: inflammation of the nose due to an allergy, Normal, Disp-2 Each, R-1  -     cetirizine (ZYRTEC) 10 mg tablet; Take 1 Tablet by mouth daily. , Normal, Disp-90 Tablet, R-1    3. Impacted cerumen of left ear  Cerumen removed by lavage by nursing staff. Patient tolerated procedure well, no otitis externa noted. -     REMOVAL IMPACTED CERUMEN IRRIGATION/LVG UNILAT      Return if symptoms worsen or fail to improve. SUBJECTIVE/OBJECTIVE:  Chief Complaint   Patient presents with    Ear Pain     Left Ear pain. X 2 weeks     Other     BP:  108 /72      P: 82  So2: 96%    Res: 14   Tem: 98.0     Patient at office for left ear symptoms. Notes 3 weeks ago nasal congestion, rhinorrhea, eyes itching, post nasal drip, ears felt clogged. Most symptoms resolved after 1 week. Mild nasal congestion has persisted. Left ear remains feeling clogged/full sensation. Intermittent aching pain.  Notes mild ringing in left ear-- comes and goes throughout the day. Non pulsatile, no associated with particular movements. No ear drainage. No headache, sore throat, cough, SOB, chest pain, n/v currently. They tried peroxide 50/50 to see if there was earwax buildup but nothing came out of ear and symptoms did not improve. Notes he has also felt increased motion sickness in last few weeks. Review of systems negative other than mentioned in HPI    Current Outpatient Medications on File Prior to Visit   Medication Sig Dispense Refill    buPROPion XL (WELLBUTRIN XL) 300 mg XL tablet Take 1 Tablet by mouth daily. 30 Tablet 1    lamoTRIgine (LaMICtal) 200 mg tablet Take 1 Tablet by mouth daily. 30 Tablet 1    [DISCONTINUED] fluticasone (FLONASE) 50 mcg/actuation nasal spray 2 Sprays by Both Nostrils route daily for 90 days. Long term, three month supply  Indications: ALLERGIC RHINITIS 3 Bottle 3     No current facility-administered medications on file prior to visit. Patient Active Problem List    Diagnosis Date Noted    Bipolar disorder, most recent episode manic ([facility] 11/11/2021    Unspecified [facility]affective) disorder ([facility] 10/22/2021    ADHD (attention deficit hyperactivity disorder) 05/01/2013    Depression 05/01/2013       Physical Exam  Constitutional:       Appearance: Normal appearance. HENT:      Right Ear: Tympanic membrane, ear canal and external ear normal.      Left Ear: Hearing and external ear normal. There is impacted cerumen. Tympanic membrane is bulging. Ears:      Comments: After ear flush L ear canal, canal is free of cerumen. No edema or erythema of canal.   Left TM is bulging with partial opacification. Nose: Congestion present. No nasal tenderness. Right Sinus: No maxillary sinus tenderness or frontal sinus tenderness. Left Sinus: No maxillary sinus tenderness or frontal sinus tenderness. Eyes:      Extraocular Movements: Extraocular movements intact.       Pupils: Pupils are equal, round, and reactive to light. Cardiovascular:      Rate and Rhythm: Normal rate and regular rhythm. Pulses: Normal pulses. Heart sounds: Normal heart sounds. Pulmonary:      Effort: Pulmonary effort is normal.      Breath sounds: Normal breath sounds. Abdominal:      General: Abdomen is flat. Bowel sounds are normal.      Palpations: Abdomen is soft. Neurological:      General: No focal deficit present. Mental Status: He is alert and oriented to person, place, and time. Psychiatric:         Mood and Affect: Mood normal.         Behavior: Behavior normal.         An electronic signature was used to authenticate this note.   -- Lucio Weeks PA-C

## 2022-03-25 ENCOUNTER — PATIENT OUTREACH (OUTPATIENT)
Dept: OTHER | Age: 42
End: 2022-03-25

## 2022-03-25 NOTE — PROGRESS NOTES
3/25/22 CCSS Consuelo assisting ACM, Calhoun Goodpasture, RN, with finding a therapist, currently seeing one that is not in network and paying out of pocket. Patient has plus plan.    Plus Plan  Contact patient's wife Stacy at her email address: [email or web address]     E-mailed the list to [email or web address].

## 2022-03-25 NOTE — PROGRESS NOTES
Telephonic outreach to patient to follow up with the patient and his wife Stacy. Patient requests that his wife speak on his behalf. Verified two patient identifiers. Per Stacy, the patient was doing well and very stable on his current medications. He then became ill and started to become more irritable and agitated. Patient was determined to have wax build up and under wax, it was noted that he had an ear infection. Patient was started on antibiotics, and placed on Augmentin. The patient and his wife have decided that they are ready to find a new therapist. Patient has been seeing a therapist that is not in network and patient has been seeing her weekly. Patient did not want to switch previously as he felt he was making good progress with the therapist he was seeing. He now feels that he could go to twice monthly with a different viewpoint. Patient is interested in seeing a . Patient and his wife consented to [employer], working with them to identify alternate therapy options. Patient also encouraged to contact [employer] to request assistance with obtaining a . Will continue to outreach regarding these requests and follow up with patient and his wife.

## 2022-04-14 ENCOUNTER — OFFICE VISIT (OUTPATIENT)
Dept: BEHAVIORAL/MENTAL HEALTH CLINIC | Age: 42
End: 2022-04-14
Payer: COMMERCIAL

## 2022-04-14 VITALS
RESPIRATION RATE: 17 BRPM | SYSTOLIC BLOOD PRESSURE: 112 MMHG | HEART RATE: 74 BPM | BODY MASS INDEX: 28.2 KG/M2 | DIASTOLIC BLOOD PRESSURE: 75 MMHG | WEIGHT: 186.1 LBS | TEMPERATURE: 97.7 F | HEIGHT: 68 IN | OXYGEN SATURATION: 98 %

## 2022-04-14 DIAGNOSIS — F32.A DEPRESSION, UNSPECIFIED DEPRESSION TYPE: ICD-10-CM

## 2022-04-14 DIAGNOSIS — F31.10 BIPOLAR DISORDER, MOST RECENT EPISODE MANIC (HCC): ICD-10-CM

## 2022-04-14 PROCEDURE — 99214 OFFICE O/P EST MOD 30 MIN: CPT | Performed by: NURSE PRACTITIONER

## 2022-04-14 RX ORDER — LAMOTRIGINE 200 MG/1
200 TABLET ORAL DAILY
Qty: 30 TABLET | Refills: 1 | Status: SHIPPED | OUTPATIENT
Start: 2022-04-14 | End: 2022-09-03 | Stop reason: SDUPTHER

## 2022-04-14 RX ORDER — BUPROPION HYDROCHLORIDE 300 MG/1
300 TABLET ORAL DAILY
Qty: 30 TABLET | Refills: 1 | Status: SHIPPED | OUTPATIENT
Start: 2022-04-14 | End: 2022-04-14

## 2022-04-14 RX ORDER — LAMOTRIGINE 200 MG/1
200 TABLET ORAL DAILY
Qty: 30 TABLET | Refills: 1 | Status: SHIPPED | OUTPATIENT
Start: 2022-04-14 | End: 2022-04-14

## 2022-04-14 RX ORDER — BUPROPION HYDROCHLORIDE 300 MG/1
300 TABLET ORAL DAILY
Qty: 30 TABLET | Refills: 1 | Status: SHIPPED | OUTPATIENT
Start: 2022-04-14 | End: 2022-05-06 | Stop reason: SDUPTHER

## 2022-04-14 NOTE — PROGRESS NOTES
CHIEF COMPLAINT:  Jeanette Sánchez is a 42 y.o. male and was seen today for follow-up of psychiatric condition and psychotropic medication management. HPI:    Robina Gallego reports the following psychiatric symptoms by hx:  depression, anxiety and jorge. Overall symptoms have been present for years. Currently symptoms of moderate severity. The symptoms occur intermittently. Pt reports medications are effective. Met with for appt today  review current treatment plan. FAMILY/SOCIAL HX:  Psychosocial Stressors     REVIEW OF SYSTEMS:  Psychiatric symptoms being monitored for:  depression, ADHD, Bipolar Disorder   Appetite:good   Sleep: good   Neuro: denies     Visit Vitals  /75 (BP 1 Location: Right upper arm, BP Patient Position: Sitting, BP Cuff Size: Adult)   Pulse 74   Temp 97.7 °F (36.5 °C) (Temporal)   Resp 17   Ht 5' 8\" (1.727 m)   Wt 84.4 kg (186 lb 1.6 oz)   SpO2 98%   BMI 28.30 kg/m²       Side Effects:  none    MENTAL STATUS EXAM:   Sensorium  oriented to time, place and person   Relations cooperative   Appearance:  age appropriate and within normal Limits   Motor Behavior:  within normal limits   Speech:  normal pitch and normal volume   Thought Process: within normal limits   Thought Content free of delusions and free of hallucinations   Suicidal ideations none   Homicidal ideations none   Mood:  within normal limits   Affect:  irritable   Memory recent  adequate   Memory remote:  adequate   Concentration:  adequate   Abstraction:  abstract   Insight:  good   Reliability good   Judgment:  good     MEDICAL DECISION MAKING:  Problems addressed today:    ICD-10-CM ICD-9-CM    1. Depression, unspecified depression type  F32. A 311 buPROPion XL (WELLBUTRIN XL) 300 mg XL tablet      DISCONTINUED: buPROPion XL (WELLBUTRIN XL) 300 mg XL tablet   2.  Bipolar disorder, most recent episode manic ([facility])  F31.10 296.40 lamoTRIgine (LaMICtal) 200 mg tablet      DISCONTINUED: lamoTRIgine (LaMICtal) 200 mg tablet Assessment:   Myke Michael is responding to treatment. Symptoms are less in occurrence and less in severity. Patient desires to come off psychotropic medications. Advised patient to continue to take medications. Discussed risks of stopping medications. Patient does believe he needs medications. Strongly encouraged patient to continue medications, but did state that he does have a choice. He and wife would like to find out if his PCP could take over psychotropic medications. Discussed current medications and dosages. We will not make any adjustments and patient will call back if is PCP is willing to take over medications. For now patient will continue treatment plan. Reviewed treatment goals and target symptoms to monitor for. Denies SI/HI/AH/VH and delusions. Plan:   1. Current Outpatient Medications   Medication Sig Dispense Refill    buPROPion XL (WELLBUTRIN XL) 300 mg XL tablet Take 1 Tablet by mouth daily. 30 Tablet 1    lamoTRIgine (LaMICtal) 200 mg tablet Take 1 Tablet by mouth daily. 30 Tablet 1    fluticasone propionate (FLONASE) 50 mcg/actuation nasal spray 2 Sprays by Both Nostrils route daily for 90 days. Long term, three month supply  Indications: inflammation of the nose due to an allergy 2 Each 1    cetirizine (ZYRTEC) 10 mg tablet Take 1 Tablet by mouth daily. 90 Tablet 1          medication changes made today: None     2. Counseling and coordination of care including instructions for treatment, risks/benefits, risk factor reduction and patient/family education. He agrees with the plan. Patient instructed to call with any side effects, questions or issues.           4/14/2022  Chris Langford NP

## 2022-04-14 NOTE — PROGRESS NOTES
Chief Complaint   Patient presents with    Medication Management   '  Visit Vitals  /75 (BP 1 Location: Right upper arm, BP Patient Position: Sitting, BP Cuff Size: Adult)   Pulse 74   Temp 97.7 °F (36.5 °C) (Temporal)   Resp 17   Ht 5' 8\" (1.727 m)   Wt 84.4 kg (186 lb 1.6 oz)   SpO2 98%   BMI 28.30 kg/m²     Provider to review medication

## 2022-04-20 ENCOUNTER — PATIENT OUTREACH (OUTPATIENT)
Dept: OTHER | Age: 42
End: 2022-04-20

## 2022-04-20 NOTE — PROGRESS NOTES
Telephonic outreach to the patient and his wife, Cas Lee. Patient's wife Stacy answered the call and verified two patient identifiers. Patient continues to stabilize in his moods and is consistently adherent to his medications. Patient and wife feel that this combination of medications has been effective. He had an appointment with the psychiatric NP on 4/14. Patient's wife stated that he has an appointment with his PCP next week, where they will discuss whether or not the medications can be managed with the PCP, no longer requiring the drive or copay to see the NP for medication management. Patient also discontinued seeing the therapist that he was paying out of pocket for (due to rapport he had build). He will start working with his [employer] , he had his first appointment with the [place] this morning. Session went well, according to his wife Stacy. He will also start seeing a [employer] . They are both pleased with the resources that are currently in place. This ECM will outreach one additional time to ensure that the transitions are smooth. Encouraged to outreach to this [facility] with questions or concerns if needed.

## 2022-04-28 ENCOUNTER — OFFICE VISIT (OUTPATIENT)
Dept: FAMILY MEDICINE CLINIC | Age: 42
End: 2022-04-28
Payer: COMMERCIAL

## 2022-04-28 VITALS
DIASTOLIC BLOOD PRESSURE: 72 MMHG | BODY MASS INDEX: 29.4 KG/M2 | HEIGHT: 68 IN | HEART RATE: 81 BPM | SYSTOLIC BLOOD PRESSURE: 118 MMHG | OXYGEN SATURATION: 96 % | RESPIRATION RATE: 18 BRPM | TEMPERATURE: 99.5 F | WEIGHT: 194 LBS

## 2022-04-28 DIAGNOSIS — E29.1 HYPOGONADISM IN MALE: Primary | ICD-10-CM

## 2022-04-28 DIAGNOSIS — F31.81 BIPOLAR 2 DISORDER, MAJOR DEPRESSIVE EPISODE (HCC): ICD-10-CM

## 2022-04-28 PROCEDURE — 99214 OFFICE O/P EST MOD 30 MIN: CPT | Performed by: FAMILY MEDICINE

## 2022-04-28 NOTE — PROGRESS NOTES
Progress Note    he is a 42y.o. year old male who presents for evalution. Subjective:     Pt here for follow up form ear infection to ensure resolution. Had low T last year he wants to explore this further and has decreased libido. Is interested in possibly having another child. Discussed possible clomid. Pt would like for me to take over the lamictal and wellbutrin and is stable on this for his depression. Reviewed PmHx, RxHx, FmHx, SocHx, AllgHx and updated and dated in the chart. Review of Systems - negative except as listed above in the HPI    Objective:     Vitals:    04/28/22 1533   BP: 118/72   Pulse: 81   Resp: 18   Temp: 99.5 °F (37.5 °C)   TempSrc: Oral   SpO2: 96%   Weight: 194 lb (88 kg)   Height: 5' 8\" (1.727 m)       Current Outpatient Medications   Medication Sig    buPROPion XL (WELLBUTRIN XL) 300 mg XL tablet Take 1 Tablet by mouth daily.  lamoTRIgine (LaMICtal) 200 mg tablet Take 1 Tablet by mouth daily.  cetirizine (ZYRTEC) 10 mg tablet Take 1 Tablet by mouth daily.  fluticasone propionate (FLONASE) 50 mcg/actuation nasal spray 2 Sprays by Both Nostrils route daily for 90 days. Long term, three month supply  Indications: inflammation of the nose due to an allergy (Patient not taking: Reported on 4/28/2022)     No current facility-administered medications for this visit. Physical Examination: General appearance - alert, well appearing, and in no distress  Ears - bilateral TM's and external ear canals normal  Chest - clear to auscultation, no wheezes, rales or rhonchi, symmetric air entry  Heart - normal rate, regular rhythm, normal S1, S2, no murmurs, rubs, clicks or gallops      Assessment/ Plan:   Diagnoses and all orders for this visit:    1. Hypogonadism in male  -     TESTOSTERONE, FREE & TOTAL; Future  Wants another child if need treatment will likely use clomid. If low discussed lifestyle changes and f/up for recheck in 1 month in AM  2. Bipolar 2 disorder, major depressive episode ([facility]  Stable on wellbutrin and lamictal     Follow-up and Dispositions    · Return if symptoms worsen or fail to improve. I have discussed the diagnosis with the patient and the intended plan as seen in the above orders. The patient has received an after-visit summary and questions were answered concerning future plans. Pt conveyed understanding of plan.     Medication Side Effects and Warnings were discussed with patient    An electronic signature was used to authenticate this note  Rich Yanes DO

## 2022-04-28 NOTE — PROGRESS NOTES
Chief Complaint   Patient presents with    Follow-up     Patient presents in office today for f/u from his visit last month for ear infection. Would like to see if you would be willing to take over his Wellbutrin and Lamictal.  No other concerns. 1. Have you been to the ER, urgent care clinic since your last visit? Hospitalized since your last visit? No    2. Have you seen or consulted any other health care providers outside of the [address] [place] [address] since your last visit? Include any pap smears or colon screening.  No    Learning Assessment 12/12/2013   PRIMARY LEARNER Patient   HIGHEST LEVEL OF EDUCATION - PRIMARY LEARNER  SOME COLLEGE   BARRIERS PRIMARY LEARNER OTHER   CO-LEARNER CAREGIVER No   PRIMARY LANGUAGE ENGLISH   LEARNER PREFERENCE PRIMARY DEMONSTRATION   ANSWERED BY patient   RELATIONSHIP SELF

## 2022-04-28 NOTE — PATIENT INSTRUCTIONS
A Healthy Lifestyle: Care Instructions  Your Care Instructions     A healthy lifestyle can help you feel good, stay at a healthy weight, and have plenty of energy for both work and play. A healthy lifestyle is something you can share with your whole family. A healthy lifestyle also can lower your risk for serious health problems, such as high blood pressure, heart disease, and diabetes. You can follow a few steps listed below to improve your health and the health of your family. Follow-up care is a key part of your treatment and safety. Be sure to make and go to all appointments, and call your doctor if you are having problems. It's also a good idea to know your test results and keep a list of the medicines you take. How can you care for yourself at home? · Do not eat too much sugar, fat, or fast foods. You can still have dessert and treats now and then. The goal is moderation. · Start small to improve your eating habits. Pay attention to portion sizes, drink less juice and soda pop, and eat more fruits and vegetables. ? Eat a healthy amount of food. A 3-ounce serving of meat, for example, is about the size of a deck of cards. Fill the rest of your plate with vegetables and whole grains. ? Limit the amount of soda and sports drinks you have every day. Drink more water when you are thirsty. ? Eat plenty of fruits and vegetables every day. Have an apple or some carrot sticks as an afternoon snack instead of a candy bar. Try to have fruits and/or vegetables at every meal.  · Make exercise part of your daily routine. You may want to start with simple activities, such as walking, bicycling, or slow swimming. Try to be active 30 to 60 minutes every day. You do not need to do all 30 to 60 minutes all at once. For example, you can exercise 3 times a day for 10 or 20 minutes.  Moderate exercise is safe for most people, but it is always a good idea to talk to your doctor before starting an exercise program.  · Keep moving. [employer]onzo Guerrierks the lawn, work in the garden, or [employer]. Take the stairs instead of the elevator at work. · If you smoke, quit. People who smoke have an increased risk for heart attack, stroke, cancer, and other lung illnesses. Quitting is hard, but there are ways to boost your chance of quitting tobacco for good. ? Use nicotine gum, patches, or lozenges. ? Ask your doctor about stop-smoking programs and medicines. ? Keep trying. In addition to reducing your risk of diseases in the future, you will notice some benefits soon after you stop using tobacco. If you have shortness of breath or asthma symptoms, they will likely get better within a few weeks after you quit. · Limit how much alcohol you drink. Moderate amounts of alcohol (up to 2 drinks a day for men, 1 drink a day for women) are okay. But drinking too much can lead to liver problems, high blood pressure, and other health problems. Family health  If you have a family, there are many things you can do together to improve your health. · Eat meals together as a family as often as possible. · Eat healthy foods. This includes fruits, vegetables, lean meats and dairy, and whole grains. · Include your family in your fitness plan. Most people think of activities such as jogging or tennis as the way to fitness, but there are many ways you and your family can be more active. Anything that makes you breathe hard and gets your heart pumping is exercise. Here are some tips:  ? Walk to do errands or to take your child to school or the bus.  ? Go for a family bike ride after dinner instead of watching TV. Where can you learn more? Go to http://www.gray.com/  Enter G642 in the search box to learn more about \"A Healthy Lifestyle: Care Instructions. \"  Current as of: June 16, 2021               Content Version: 13.2  © 9177-8171 [employer].    Care instructions adapted under license by [employer] (which disclaims liability or warranty for this information). If you have questions about a medical condition or this instruction, always ask your healthcare professional. [address] any warranty or liability for your use of this information.

## 2022-05-02 ENCOUNTER — TELEPHONE (OUTPATIENT)
Dept: BEHAVIORAL/MENTAL HEALTH CLINIC | Age: 42
End: 2022-05-02

## 2022-05-02 NOTE — TELEPHONE ENCOUNTER
EMERGENCY DEPARTMENT ENCOUNTER      CHIEF COMPLAINT    Chief Complaint   Patient presents with   • Shortness of Breath   • Wheezing       HISTORY OF PRESENT ILLNESS   Leon Borrero is a 60 year old male with history of coronary artery disease, hyperlipidemia, asthma/COPD, hypertension, type 2 diabetes, pacemaker/defibrillator dependence, PE, and atrial fibrillation on warfarin who presents to the ED from home via EMS for evaluation and treatment of shortness of breath with this occult E breathing and chest heaviness for the past few days.  Symptoms started over the past weekend and have been gradually worsening.  He also reports distended abdomen but denies any abdominal pain, nausea, vomiting, or diarrhea.  He does have baseline lower extremity edema and states he feels as though he is having a CHF exacerbation.  Took 2 DuoNebs today.  Was recently prescribed Zithromax and prednisone but did not  these prescriptions.  No fever or chills or body aches.  Symptoms are worse with activity and improved with rest.  He is unable to lay flat.  Denies any urinary symptoms.  States his baseline cough is unchanged and without productive sputum.  He has no other concerns.    Recent cardiac diagnostics/procedures:   Echo 08/18/2021   Limited echo for pericardial effusion.  There is no pericardial effusion.  ICD/pacer lead visualized in RV.    11/6/2020 Stress (as per Dr. Haywood's note 11/5/21)  Nondiagnostic response electrocardiographically due to ST wave changes at baseline Regadenoson related symptoms that resolve dpromptly in recovery. Large, fixed defect in mid to distal anteroseptal wall, apex and inferoapical wall consistent with previous large anteroseptal and apical myocardial infarction. Fixed defect in the inferior wall consistent with diaphragmatic attenuation. No evidence of ischemia.  Severely reduced LV systolic function in global fashion with akinesis of anteroseptal wall and the apex. There is mild  Patient's wife left voicemail confirming the transfer of patient's care to Dr. Jesús Chinchilla. Says this has been discussed with both Marielle and Dr. Jesús Chinchilla, but wanted to call and give us an update.     CB: [phone] cardiomegaly.    6/28/2020 Echo (as per Dr. Haywood's note 11/5/21)  Poor endocardial definition, IV contrast used. Small apical thrombus visualized. Moderately increased left ventricular cavity size. Mildly increased left ventricular wall thickness. Contractility slightly preserved in basal segments, apex and periapical sements akinetic. Severely decreased left ventricular systolic function. Left ventricular ejection fraction, 2530 %. Grade III diastolic dysfunction (restrictive filling pattern), severely elevated filling pressures. Mildly increased right ventricular size. Normal right ventricular systolic function. Moderately increased left atrial size. Mild mitral annular calcification. Mildtomoderate mitral valve regurgitation. VSP could not be calculated due to incomplete tricuspid regurgitation velocity profile. Mildly dilated aortic annulus, measures 4.2 cm . Mildly dilated ascending aorta, measures 4.2 cm.  Dilated IVC with normal respiratory variation. Estimated right atrial pressure 8mmHg. Compared with 3/2020 echo, EF dropped from 42%, apical clot persistent, mitral regurgitation slightly worsened.     Left heart cath 6/2019 (as per Dr. Haywood's note 11/5/21)  Right dominant coronary system. Left main distal 20% stenosis LAD with long stented segment with no significant in-stent restenosis, proximal LAD calcified plaque/mild stenosis. Very proximally arising OM1 with patent stent ,circumflex just beyond bifurcation with 70% stenosis (similar to 2015)  RCA with patent stents (proximal to mid and more distally in the midportion), distal RCA mild diffuse irregularity      ALLERGIES    ALLERGIES:   Allergen Reactions   • Aspirin ANAPHYLAXIS     THROAT CLOSE UP    • Losartan Potassium Angioedema   • Fluconazole RASH   • Ace Inhibitors Angioedema     History of angioedema with losartan   • Piperacillin Sod-Tazobactam So RASH       CURRENT MEDICATIONS    Current Facility-Administered Medications    Medication Dose Route Frequency Provider Last Rate Last Admin   • albuterol (VENTOLIN) nebulizer 10 mg  10 mg Nebulization Continuous Fortino Paula PA-C   10 mg at 12/07/21 1321   • acetaminophen (TYLENOL) tablet 1,000 mg  1,000 mg Oral Q6H PRN Chance Donaldson MD       • ipratropium-albuterol (DUONEB) 0.5-2.5 (3) MG/3ML nebulizer solution 3 mL  3 mL Nebulization 4x Daily Resp Chance Donaldson MD       • atorvastatin (LIPITOR) tablet 80 mg  80 mg Oral Daily Chance Donaldson MD       • [START ON 12/8/2021] azithromycin (ZITHROMAX) tablet 500 mg  500 mg Oral Once per day on Mon Wed Fri Chance Donaldson MD       • buPROPion XL (WELLBUTRIN XL) tablet 300 mg  300 mg Oral Daily Chance Donaldson MD       • clopidogrel (PLAVIX) tablet 75 mg  75 mg Oral Daily Chance Donaldson MD       • roflumilast (DALIRESP) tablet 500 mcg  500 mcg Oral Daily Chance Donaldson MD       • DULoxetine (CYMBALTA) capsule 30 mg  30 mg Oral Daily Chance Donaldson MD       • zolpidem (AMBIEN) tablet 5 mg  5 mg Oral Nightly PRN Chance Donaldson MD       • gabapentin (NEURONTIN) capsule 300 mg  300 mg Oral TID Chance Donaldson MD       • guaiFENesin (MUCINEX) ER tablet 600 mg  600 mg Oral 2 times per day Chance Donaldson MD       • HYDROcodone-acetaminophen (NORCO) 7.5-325 MG per tablet 1 tablet  1 tablet Oral Q8H PRN Chance Donaldson MD       • [START ON 12/8/2021] magnesium oxide (MAG-OX) tablet 400 mg  400 mg Oral Daily Chance Donaldson MD       • metoPROLOL succinate (TOPROL-XL) ER tablet 25 mg  25 mg Oral Daily Chance Donaldson MD       • potassium CHLORIDE (KLOR-CON M) tawanna ER tablet 20 mEq  20 mEq Oral Daily Chance Donaldson MD       • predniSONE (DELTASONE) tablet 20 mg  20 mg Oral Daily Chance Donaldson MD       • tamsulosin (FLOMAX) capsule 0.4 mg  0.4 mg Oral Daily PC Chance Donaldson MD       • fluticasone-umeclidin-vilanterol (TRELEGY ELLIPTA) 100-62.5-25 MCG/INH inhaler 1 puff  1 puff Inhalation Daily Resp Chance Donaldson MD       • montelukast  (SINGULAIR) tablet 10 mg  10 mg Oral Q Evening Chance Donaldson MD       • WARFARIN - PHARMACIST MONITORED Misc   Does not apply See Admin Instructions Chance Donaldson MD       • furosemide (LASIX INJECT) injection 40 mg  40 mg Intravenous BID Chance Donaldson MD       • dextrose 50 % injection 25 g  25 g Intravenous PRN Chance Donaldson MD       • dextrose 50 % injection 12.5 g  12.5 g Intravenous PRN Chance Donaldson MD       • glucagon (GLUCAGEN) injection 1 mg  1 mg Intramuscular PRN Chance Donaldson MD       • dextrose (GLUTOSE) 40 % gel 15 g  15 g Oral PRN Chance Donaldson MD       • dextrose (GLUTOSE) 40 % gel 30 g  30 g Oral PRN Chance Donaldson MD       • sodium chloride 0.9 % flush bag 25 mL  25 mL Intravenous PRN Chance Donaldson MD       • sodium chloride (PF) 0.9 % injection 2 mL  2 mL Intracatheter 2 times per day Chance Donaldson MD       • insulin lispro (ADMELOG,HumaLOG) - Correction Dose   Subcutaneous Nightly Chance Donaldson MD       • insulin lispro (ADMELOG,HumaLOG) - Correction Dose   Subcutaneous TID WC Chance Donaldson MD       • ondansetron (ZOFRAN) injection 4 mg  4 mg Intravenous Q6H PRN Chance Donaldson MD           PAST MEDICAL HISTORY    Past Medical History:   Diagnosis Date   • Acute MI (CMS/[facility])     1st  at age 45 with stent placement, 2nd 1 year later   • Anxiety and depression 3/17/2020   • Asthma    • Atrial fibrillation (CMS/[facility])    • Blood clot associated with vein wall inflammation    • Bronchitis    • Cardiac failure congestive    • Cerebral artery occlusion with cerebral infarction (CMS/HCC)     2012   • Cholelithiasis    • Chronic pain     back, legs   • COPD (chronic obstructive pulmonary disease) (CMS/HCC) 3/2/2015   • Coronary artery disease    • Crush injury of hand 06/26/1965    2nd and 3rd fingers of right hand   • Depressive disorder    • Diabetes mellitus (CMS/[facility])    • Essential (primary) hypertension    • Gastroesophageal reflux disease    • Hx pulmonary embolism 5/17/2021     5/12/2021 Subsubsegmental pulmonary embolism   • MRSA (methicillin resistant staph aureus) culture positive     (12/30/10 MRSA positive nares) pt has had 2 negative MRSA results 10/1/2011 and 4/26/2013   • MRSA (methicillin resistant Staphylococcus aureus)    • Pharyngeal edema 10/10    pharyngeal edema since 10/10 after PO med intake; severe gagging   • Pneumonia    • Sleep apnea     bi-pap   • Supracondylar fracture of humerus 06/19/1965    right   • Urinary tract infection 06/15/2019    hospitalized for that       SURGICAL HISTORY    Past Surgical History:   Procedure Laterality Date   • Cardiac catherization  8/3/2015    stents all open.  Moderate RCA disease.  50% ostial CX unchanged.  EF 38% unchanged with anteroapical akinesis and LVEDP  17 mmHg.   • Cardiac catherization  06/26/2016    Left main distal 20% stenosis. LAD with long stented segment with no significant in-stent restenosis, prox LAD calcified plaque/mild stenosis. Very prox arising OM1 with patent stent, Cx just beyond bifurcation with 70% stenosis (similar to 2015). RCA with patent stents (proximal to mid and more distally in the midportion), distal RCA mild diffuse irregularity   • Cardiac catherization  12/23/2010    Angioplasty and stenting of left mid anterior descending with a drug eluting Xience stent   • Cardiac catherization  06/03/2008    Successful PTCA and stenting of the proximal right coronary artery with reduction of stenosis from 80-90% down to 0%.   • Cardiac catherization  07/11/2011   • Eye surgery      lasic; age 47   • Fracture surgery      4th and 5th digits on R Hand   • Icd implant Left 07/09/2020    Left pectoral area; single chamber ICD implant via left axillary venous appraoch   • Incision and drainage Left 01/14/2015    Bacterial infection in the prepatellar space of the left knee. Incision and drainage of this abscess and then subsequent sharp surgical debridement of the prepatellar bursa   • Inguinal hernia repair  Bilateral     age 8   • Mass excision  2019     Excision of left arm skin mass; Excision of left arm lymph nodes. SKIN WITH ACUTE AND CHRONIC GRANULOMATOUS FUNGAL FOLLICULITIS WITH RUPTURED HAIR FOLLICLES AND MICROABSCESSES.  Lymph nodes, left forearm x 2, excision:  SOFT TISSUE WITH NECROTIZING GRANULOMATOUS INFLAMMATION WITH FUNGAL ORGANISMS (INDETERMINATE TYPE) AND FIBROSIS, NEGATIVE FOR MALIGNANCY AND LYMPH NODE.   • Palate/uvula surgery unlisted      uvulectomy   • Repair brow ptosis Bilateral 2020    Bilateral ptosis repair, bilateral lower lid blepharoplasty Dr. Crowe    • Sinus surgery Bilateral 1994    Bilateral total ethmoidectomy, bilateral intranasal sphenoidectomy with polypectomy, nasal septal reconstruction.   • Sinus surgery Bilateral 10/19/2000    bilateral middle meatal antrostomy, ethmoidectomy, frontal recess polypectomy, sphenoidectomy under [employer] guidance with endoscopic technique   • Tonsillectomy and adenoidectomy     • Wound exploration Left 2021    left middle finger exploration of wound/[place]       SOCIAL HISTORY    Social History     Tobacco Use   • Smoking status: Former Smoker     Packs/day: 0.25     Years: 57.00     Pack years: 14.25     Types: Cigarettes     Start date:      Quit date: 2019     Years since quittin.2   • Smokeless tobacco: Never Used   Vaping Use   • Vaping Use: never used   Substance Use Topics   • Alcohol use: Not Currently     Alcohol/week: 1.0 standard drink     Types: 1 Cans of beer per week     Comment: rare   • Drug use: No       FAMILY HISTORY    Family History   Problem Relation Age of Onset   • Aneurysm Mother 54        carotid artery - fatal   • Cancer, Breast Mother    • Osteoarthritis Mother    • Asthma Father    • Allergic Rhinitis Father    • Cataracts Father    • Glaucoma Father    • Vision Loss Father         legally blind         REVIEW OF SYSTEMS    Constitutional:  See history of present illness   Eyes:   Denies eye pain  HENT:  Denies sore throat and ear pain   Respiratory:  See history of present illness   Cardiovascular:  See history of present illness  GI:  Denies abdominal pain and blood in stool  :  Denies dysuria   Musculoskeletal:  Denies back pain and neck pain   Integument:  Denies rash  Neurologic:  Denies headache       PHYSICAL EXAM    Vitals:    12/07/21 1415 12/07/21 1430 12/07/21 1445 12/07/21 1503   BP: 132/70 128/68 118/70 123/68   BP Location:    LUE - Left upper extremity   Patient Position:    Semi-Thomas's   Pulse: 93 100 97 98   Resp: 18 (!) 25 (!) 22 16   Temp:    98.1 °F (36.7 °C)   TempSrc:    Temporal   SpO2: 98% 95% 95% 94%   Weight:       Height:           Pulse Ox Interpretation:  98% on room air  Constitutional:  Appears older than stated age.  Chronically ill-appearing.  Sitting upright on exam table.  No acute distress, non-toxic appearance. No diaphoresis.   Eyes:  Sclera anicteric. PERRL. EOMI.   HENT:  Head is atraumatic. Moist mucous tacky.  Neck:  Full passive range of motion. Neck is supple.   Respiratory:  Mild-to-moderate respiratory distress. Mild accessory muscle use noted.  Moderate tachypnea noted. Lungs are coarse throughout with prolonged expiratory phase as well as wheezing. No rales appreciated. No stridor.   Cardiovascular:  Normal rate, normal rhythm. No murmur noted.  DP pulses 2+ bilaterally.  GI:  Normal bowel sounds. Abdomen is soft with mild distention.  No tenderness. No rebound or guarding.  :  No costovertebral angle tenderness.   Musculoskeletal:  No obvious deformities. Bilateral lower extremities with trace edema.  Integument:  No obvious acute lesions or chest wall rash. Capillary refill 3 seconds.  Neurologic:  GCS 15. Alert & oriented to conversation. Moves all extremities. Speech is clear.   Psychiatric:  Speech and behavior appropriate.       LABS    Results for orders placed or performed during the hospital encounter of 12/07/21   Comprehensive  Metabolic Panel   Result Value    Fasting Status     Sodium 147 (H)    Potassium 3.4    Chloride 109 (H)    Carbon Dioxide 27    Anion Gap 14    Glucose 99    BUN 17    Creatinine 1.05    Glomerular Filtration Rate 76     Comment: eGFR results = or >60 mL/min/1.73m2 = Normal kidney function. Estimated GFR calculated using the 2009 CKD-EPI creatinine equation.      BUN/ Creatinine Ratio 16    Calcium 8.3 (L)    Bilirubin, Total 0.4    GOT/AST 28    GPT/ALT 45    Alkaline Phosphatase 71    Albumin 3.1 (L)    Protein, Total 6.3 (L)    Globulin 3.2    A/G Ratio 1.0   Lipase   Result Value    Lipase 195   Magnesium   Result Value    Magnesium 2.3   TROPONIN I, HIGH SENSITIVITY   Result Value    Troponin I, High Sensitivity 43   NT proBNP   Result Value    NT-proBNP 1,688 (H)   Prothrombin Time   Result Value    Prothrombin Time 19.8 (H)    INR 1.9     Comment: INR Therapeutic Range: 2.0 to 3.0 (2.5 to 3.5 recommended for recurrent thrombotic episodes and mechanical prosthetic heart valves.)   Partial Thromboplastin Time   Result Value    PTT 27   Urinalysis With Microscopy & Culture If Indicated   Result Value    COLOR, URINALYSIS Straw    APPEARANCE, URINALYSIS Clear    GLUCOSE, URINALYSIS >=500 (A)    BILIRUBIN, URINALYSIS Negative    KETONES, URINALYSIS Negative    SPECIFIC GRAVITY, URINALYSIS 1.016    OCCULT BLOOD, URINALYSIS Negative    PH, URINALYSIS 6.0    PROTEIN, URINALYSIS Negative    UROBILINOGEN, URINALYSIS 0.2    NITRITE, URINALYSIS Negative    LEUKOCYTE ESTERASE, URINALYSIS Negative    SQUAMOUS EPITHELIAL, URINALYSIS 1 to 5    ERYTHROCYTES, URINALYSIS None Seen    LEUKOCYTES, URINALYSIS 1 to 5    BACTERIA, URINALYSIS None Seen    HYALINE CASTS, URINALYSIS None Seen   Lactic Acid Venous With Reflex   Result Value    Lactate, Venous 2.4 (HH)   Procalcitonin   Result Value    Procalcitonin <0.05     Comment:   Bacterial Sepsis:  <0.5 ng/mL:    Sepsis not likely. Localized bacterial infection possible.  0.5 - 2  ng/mL: Sepsis or other conditions possible  >2.0 ng/mL:    High risk of sepsis/septic shock    NOTE: If bacterial sepsis is of high clinical suspicion but PCT<2 ng/mL,  consider recheck PCT 6-24 hours after initial test.     Lower Respiratory Tract Infection (LRTI):  <0.1 ng/mL:       Bacterial infection highly unlikely  0.1 - 0.25 ng/mL: Bacterial infection unlikely  0.26 - 0.5 ng/mL: Bacterial infection likely  > 0.5 ng/mL:      Bacterial infection highly likely    NOTE: Patients with advanced CKD or medical/surgical trauma may have  elevated baseline PCT (>0.5 ng/mL) in the absence of bacterial infection. If  bacterial infection is suspected, consider repeat PCT in 2 to 4 days to guide de-escalation or discontinuation of antibiotic therapy.  Higher reference range cutoffs may be appropriate for patients <3 days old.     SARS-COV-2/INFLUENZA BY PCR   Result Value    Rapid SARS-COV-2 by PCR Not Detected    Influenza A by PCR Not Detected    Influenza B by PCR Not Detected    Isolation Guidelines      Comment: Do not use this test result as the sole decision-maker for discontinuation of isolation.   Clinical evaluation should be considered for other respiratory illness requiring transmission-based isolation.    -    No fever (<99.0 F/37.2 C) for at least 24 hours without the use of fever-reducing medications    AND  -    Respiratory symptoms have improved or resolved (e.g. cough, shortness of breath)     AND  -    COVID-19 negative test    See COVID-19 Deisolation Resource Guide    Procedural Comment      Comment: SARS-COV-2 nucleic acid has not been detected indicating the absence of COVID-19.    This test was performed using the [employer] Xpert Xpress SARS-CoV-2/Flu/RSV RT-PCR test that has been given Emergency Use Authorization (EUA) by the [place] Food and Drug Administration (FDA).  These tests are considered definitive and do not need to be confirmed by another method.   CBC with Automated Differential  (performable only)   Result Value    WBC 10.6    RBC 5.05    HGB 13.2    HCT 41.3    MCV 81.8    MCH 26.1    MCHC 32.0    RDW-CV 18.5 (H)    RDW-SD 54.4 (H)        NRBC 0    Neutrophil, Percent 76    Lymphocytes, Percent 15    Mono, Percent 8    Eosinophils, Percent 1    Basophils, Percent 0    Immature Granulocytes 0    Absolute Neutrophils 8.0 (H)    Absolute Lymphocytes 1.6    Absolute Monocytes 0.8    Absolute Eosinophils  0.1    Absolute Basophils 0.0    Absolute Immmature Granulocytes 0.0   Lactic Acid, Venous   Result Value    Lactate, Venous 2.5 (HH)   TROPONIN I, HIGH SENSITIVITY   Result Value    Troponin I, High Sensitivity 38   Electrocardiogram 12-Lead   Result Value    Systolic Blood Pressure 122    Diastolic Blood Pressure 73    Ventricular Rate EKG/Min (BPM) 86    Atrial Rate (BPM) 86    DE-Interval (MSEC) 148    QRS-Interval (MSEC) 150    QT-Interval (MSEC) 426    QTc 510    P Axis (Degrees) 75    R Axis (Degrees) -145    T Axis (Degrees) 71    REPORT TEXT      Atrial-sensed ventricular-paced rhythm  Abnormal ECG  When compared with ECG of  18-OCT-2021 10:54,  Vent. rate  has decreased  BY   8 BPM  Confirmed by KELVIN LINDQUIST MD [id number]),  Meg Dietrich ([id number]) on 12/7/2021 12:06:33 PM     Electrocardiogram 12-Lead   Result Value    Systolic Blood Pressure 125    Diastolic Blood Pressure 81    Ventricular Rate EKG/Min (BPM) 87    Atrial Rate (BPM) 87    DE-Interval (MSEC) 152    QRS-Interval (MSEC) 148    QT-Interval (MSEC) 418    QTc 503    P Axis (Degrees) 82    R Axis (Degrees) -137    T Axis (Degrees) 71    REPORT TEXT      Paced  Abnormal ECG  When compared with ECG of  07-DEC-2021 09:33,  No significant change was found  read at 1236  Confirmed by SANCHEZ FUNG MD [id number]),  Meg Dietrich ([id number]) on 12/7/2021 1:30:14 PM     GLUCOSE, BEDSIDE - POINT OF CARE   Result Value    GLUCOSE, BEDSIDE - POINT OF CARE 167 (H)     Comment: Notified RN         RADIOLOGY    CT ABDOMEN  PELVIS W CONTRAST   Final Result   IMPRESSION:      1. No evidence for appendicitis, diverticulitis, bowel obstruction,   pancreatitis             XR Chest AP or PA   Final Result   IMPRESSION:   Probable pulmonary venous congestion, unchanged from 10/18/2021.   Overall no change from the prior study.             EKG  Clinical Impression:  Paced rhythm with a rate of 86  Interpreted By:  ED Physician    Clinical Impression:  Paced rhythm with a rate of 87  Interpreted By:  ED Physician      ED TREATMENTS  Medications   albuterol (VENTOLIN) nebulizer 10 mg (10 mg Nebulization New Bag 12/7/21 1321)   acetaminophen (TYLENOL) tablet 1,000 mg (has no administration in time range)   ipratropium-albuterol (DUONEB) 0.5-2.5 (3) MG/3ML nebulizer solution 3 mL (has no administration in time range)   atorvastatin (LIPITOR) tablet 80 mg (has no administration in time range)   azithromycin (ZITHROMAX) tablet 500 mg (has no administration in time range)   buPROPion XL (WELLBUTRIN XL) tablet 300 mg (has no administration in time range)   clopidogrel (PLAVIX) tablet 75 mg (has no administration in time range)   roflumilast (DALIRESP) tablet 500 mcg (has no administration in time range)   DULoxetine (CYMBALTA) capsule 30 mg (has no administration in time range)   zolpidem (AMBIEN) tablet 5 mg (has no administration in time range)   gabapentin (NEURONTIN) capsule 300 mg (has no administration in time range)   guaiFENesin (MUCINEX) ER tablet 600 mg (has no administration in time range)   HYDROcodone-acetaminophen (NORCO) 7.5-325 MG per tablet 1 tablet (has no administration in time range)   magnesium oxide (MAG-OX) tablet 400 mg (has no administration in time range)   metoPROLOL succinate (TOPROL-XL) ER tablet 25 mg (has no administration in time range)   potassium CHLORIDE (KLOR-CON M) tawanna ER tablet 20 mEq (has no administration in time range)   predniSONE (DELTASONE) tablet 20 mg (has no administration in time range)   tamsulosin  (FLOMAX) capsule 0.4 mg (has no administration in time range)   fluticasone-umeclidin-vilanterol (TRELEGY ELLIPTA) 100-62.5-25 MCG/INH inhaler 1 puff (has no administration in time range)   montelukast (SINGULAIR) tablet 10 mg (has no administration in time range)   WARFARIN - PHARMACIST MONITORED Misc (has no administration in time range)   furosemide (LASIX INJECT) injection 40 mg (has no administration in time range)   dextrose 50 % injection 25 g (has no administration in time range)   dextrose 50 % injection 12.5 g (has no administration in time range)   glucagon (GLUCAGEN) injection 1 mg (has no administration in time range)   dextrose (GLUTOSE) 40 % gel 15 g (has no administration in time range)   dextrose (GLUTOSE) 40 % gel 30 g (has no administration in time range)   sodium chloride 0.9 % flush bag 25 mL (has no administration in time range)   sodium chloride (PF) 0.9 % injection 2 mL (has no administration in time range)   insulin lispro (ADMELOG,HumaLOG) - Correction Dose (has no administration in time range)   insulin lispro (ADMELOG,HumaLOG) - Correction Dose (has no administration in time range)   ondansetron (ZOFRAN) injection 4 mg (has no administration in time range)   ipratropium-albuterol (DUONEB) 0.5-2.5 (3) MG/3ML nebulizer solution 3 mL (3 mLs Nebulization Given 12/7/21 1100)   methylPREDNISolone (SOLU-Medrol) PF injection 125 mg (125 mg Intravenous Given 12/7/21 1059)   LORazepam (ATIVAN) injection 1 mg (1 mg Intravenous Given 12/7/21 1138)   sodium chloride 0.9 % injector flush 100 mL (100 mLs Injection Given 12/7/21 1217)   iohexol (OMNIPAQUE 300) contrast solution 100 mL (100 mLs Intravenous Given 12/7/21 1217)   furosemide (LASIX INJECT) injection 40 mg (40 mg Intravenous Given 12/7/21 1306)   morphine injection 4 mg (4 mg Intravenous Given 12/7/21 1308)         ED COURSE & MEDICAL DECISION MAKING    60 year old male who presents with cough with shortness of breath and chest heaviness for  the past few days.    Cardiologist:  Dr. Haywood    Patient is afebrile and nontoxic appearing.  Tachypneic on presentation.  All other vital signs physiologic.  Exam concerning for COPD versus CHF versus both.  Possibly influenza or COVID-19 or other viral illness or bacterial pneumonia.  Doubt atypical ACS or PE. Will evaluate with blood work, EKG, urinalysis, chest x-ray, and CT abdomen pelvis.  We will try DuoNeb and Solu-Medrol.    CBC grossly unremarkable.  Does have lactic acidosis but procalcitonin negative.  INR subtherapeutic at 1.9.  CMP with mild hypernatremia.  Magnesium within normal limits.  Lipase negative.  Serial EKGs are paced.  Initial troponin 43 and serial troponin is 38 with a dealt of 5.  ProBNP greater than 1600 and chest imaging is concerning for pulmonary venous congestion.  No consolidation or infiltrate.  Tiny right upper pleural effusion noted.  Urinalysis not concerning for infection.  CT without acute process.    Symptoms concerning for CHF exacerbation versus COPD exacerbation versus both.  Lung sounds have opened up on reassessment and he is noted to have increased wheezing.  He is started on continuous nebulizer of albuterol.  Given his increased respiratory effort I do feel he is a candidate for admission.  He has not been hypoxic throughout ED stay.  Patient agreeable to plans for admission.  All questions and concerns addressed.    Discussed case with IBIS Gallo who accepts admission to hospital. Patient stable at time of admission.      IMPRESSION  Diagnosis:  The primary encounter diagnosis was COPD exacerbation (CMS/HCC). Diagnoses of Acute on chronic congestive heart failure, unspecified heart failure type (CMS/HCC), Subtherapeutic international normalized ratio (INR), and Lactic acidosis were also pertinent to this visit.      Fortino Paula PA-C    Supervising physician: KELVIN Bynum      I discussed/staffed case with KELVIN Bynum, who agrees with  the assessment and plan outlined above.       Fortino Paula PA-C  12/07/21 [id number]

## 2022-05-06 DIAGNOSIS — F32.A DEPRESSION, UNSPECIFIED DEPRESSION TYPE: ICD-10-CM

## 2022-05-06 RX ORDER — BUPROPION HYDROCHLORIDE 300 MG/1
300 TABLET ORAL DAILY
Qty: 30 TABLET | Refills: 1 | Status: SHIPPED | OUTPATIENT
Start: 2022-05-06 | End: 2022-06-14

## 2022-05-06 NOTE — PROGRESS NOTES
Wellbutrin 300 mg XL sent to pharmacy below:   Bhakti Ernst [address] [facility], [address] AT [facility] [address]   Phone:  [phone]  Fax:  [phone]

## 2022-05-09 LAB
TESTOST FREE SERPL-MCNC: 4.8 PG/ML (ref 6.8–21.5)
TESTOST SERPL-MCNC: 253 NG/DL (ref 264–916)

## 2022-05-10 ENCOUNTER — PATIENT OUTREACH (OUTPATIENT)
Dept: OTHER | Age: 42
End: 2022-05-10

## 2022-05-10 NOTE — PROGRESS NOTES
Your testosterone levels remain low. We had worked this up last year. We can start clomiphene citrate half a tab daily for the low testosterone. I would then have you follow-up in 2 months for a recheck in the morning.   Let me know your thoughts

## 2022-06-14 ENCOUNTER — VIRTUAL VISIT (OUTPATIENT)
Dept: FAMILY MEDICINE CLINIC | Age: 42
End: 2022-06-14
Payer: COMMERCIAL

## 2022-06-14 DIAGNOSIS — F32.A DEPRESSION, UNSPECIFIED DEPRESSION TYPE: ICD-10-CM

## 2022-06-14 DIAGNOSIS — E29.1 HYPOGONADISM IN MALE: Primary | ICD-10-CM

## 2022-06-14 PROCEDURE — 99214 OFFICE O/P EST MOD 30 MIN: CPT | Performed by: FAMILY MEDICINE

## 2022-06-14 RX ORDER — BUPROPION HYDROCHLORIDE 150 MG/1
150 TABLET ORAL DAILY
Qty: 90 TABLET | Refills: 1 | Status: SHIPPED | OUTPATIENT
Start: 2022-06-14 | End: 2022-08-30

## 2022-06-14 RX ORDER — CLOMIPHENE CITRATE 50 MG/1
25 TABLET ORAL DAILY
Qty: 45 TABLET | Refills: 0 | Status: SHIPPED | OUTPATIENT
Start: 2022-06-14 | End: 2022-08-30 | Stop reason: SDUPTHER

## 2022-06-14 NOTE — PATIENT INSTRUCTIONS
A Healthy Lifestyle: Care Instructions  Your Care Instructions     A healthy lifestyle can help you feel good, stay at a healthy weight, and have plenty of energy for both work and play. A healthy lifestyle is something you can share with your whole family. A healthy lifestyle also can lower your risk for serious health problems, such as high blood pressure, heart disease, and diabetes. You can follow a few steps listed below to improve your health and the health of your family. Follow-up care is a key part of your treatment and safety. Be sure to make and go to all appointments, and call your doctor if you are having problems. It's also a good idea to know your test results and keep a list of the medicines you take. How can you care for yourself at home? · Do not eat too much sugar, fat, or fast foods. You can still have dessert and treats now and then. The goal is moderation. · Start small to improve your eating habits. Pay attention to portion sizes, drink less juice and soda pop, and eat more fruits and vegetables. ? Eat a healthy amount of food. A 3-ounce serving of meat, for example, is about the size of a deck of cards. Fill the rest of your plate with vegetables and whole grains. ? Limit the amount of soda and sports drinks you have every day. Drink more water when you are thirsty. ? Eat plenty of fruits and vegetables every day. Have an apple or some carrot sticks as an afternoon snack instead of a candy bar. Try to have fruits and/or vegetables at every meal.  · Make exercise part of your daily routine. You may want to start with simple activities, such as walking, bicycling, or slow swimming. Try to be active 30 to 60 minutes every day. You do not need to do all 30 to 60 minutes all at once. For example, you can exercise 3 times a day for 10 or 20 minutes.  Moderate exercise is safe for most people, but it is always a good idea to talk to your doctor before starting an exercise program.  · Keep moving. Shital Cano the lawn, work in the garden, or [employer]. Take the stairs instead of the elevator at work. · If you smoke, quit. People who smoke have an increased risk for heart attack, stroke, cancer, and other lung illnesses. Quitting is hard, but there are ways to boost your chance of quitting tobacco for good. ? Use nicotine gum, patches, or lozenges. ? Ask your doctor about stop-smoking programs and medicines. ? Keep trying. In addition to reducing your risk of diseases in the future, you will notice some benefits soon after you stop using tobacco. If you have shortness of breath or asthma symptoms, they will likely get better within a few weeks after you quit. · Limit how much alcohol you drink. Moderate amounts of alcohol (up to 2 drinks a day for men, 1 drink a day for women) are okay. But drinking too much can lead to liver problems, high blood pressure, and other health problems. Family health  If you have a family, there are many things you can do together to improve your health. · Eat meals together as a family as often as possible. · Eat healthy foods. This includes fruits, vegetables, lean meats and dairy, and whole grains. · Include your family in your fitness plan. Most people think of activities such as jogging or tennis as the way to fitness, but there are many ways you and your family can be more active. Anything that makes you breathe hard and gets your heart pumping is exercise. Here are some tips:  ? Walk to do errands or to take your child to school or the bus.  ? Go for a family bike ride after dinner instead of watching TV. Where can you learn more? Go to http://www.gray.com/  Enter K427 in the search box to learn more about \"A Healthy Lifestyle: Care Instructions. \"  Current as of: June 16, 2021               Content Version: 13.2  © 3600-9342 Healthwise, Incorporated.    Care instructions adapted under license by [employer] (which disclaims liability or warranty for this information). If you have questions about a medical condition or this instruction, always ask your healthcare professional. Shawna Ramírez any warranty or liability for your use of this information.

## 2022-06-14 NOTE — PROGRESS NOTES
Progress Note    he is a 36y.o. year old male who presents for evalution. Subjective:     Patient here for follow-up to discuss his labs low testosterone had previously been worked up. Interested in possibly having another child and we had discussed trial of clomiphene citrate 25 mg daily. His wife is also here today and they both would like for him to try this. Has low energy levels low libido. Patient also with bipolar depression disorder currently on Lamictal and Wellbutrin and this is well controlled. He is interested in decreasing his pill burden and he would like to decrease the dose of Wellbutrin to 150 which I am agreeable to. His wife also agrees with a trial of this. We discussed that he should stay on the Lamictal for mood stabilization. He is agreeable to that. Reviewed PmHx, RxHx, FmHx, SocHx, AllgHx and updated and dated in the chart. Review of Systems - negative except as listed above in the HPI    Objective: There were no vitals filed for this visit. Current Outpatient Medications   Medication Sig    buPROPion XL (WELLBUTRIN XL) 150 mg tablet Take 1 Tablet by mouth daily.  clomiPHENE (CLOMID) 50 mg tablet Take 0.5 Tablets by mouth daily.  lamoTRIgine (LaMICtal) 200 mg tablet Take 1 Tablet by mouth daily.  fluticasone propionate (FLONASE) 50 mcg/actuation nasal spray 2 Sprays by Both Nostrils route daily for 90 days. Long term, three month supply  Indications: inflammation of the nose due to an allergy (Patient not taking: Reported on 4/28/2022)    cetirizine (ZYRTEC) 10 mg tablet Take 1 Tablet by mouth daily. No current facility-administered medications for this visit. Physical Examination: General appearance - alert, well appearing, and in no distress  Mental status - alert, oriented to person, place, and time      Assessment/ Plan:   Diagnoses and all orders for this visit:    1.  Hypogonadism in male  -     clomiPHENE (CLOMID) 50 mg tablet; Take 0.5 Tablets by mouth daily. 2. Depression, unspecified depression type  -     buPROPion XL (WELLBUTRIN XL) 150 mg tablet; Take 1 Tablet by mouth daily. Continue with Lamictal decrease Wellbutrin to 150 once daily  Follow-up and Dispositions    · Return in about 2 months (around 8/14/2022), or if symptoms worsen or fail to improve, for Medication evaluation. I have discussed the diagnosis with the patient and the intended plan as seen in the above orders. The patient has received an after-visit summary and questions were answered concerning future plans. Pt conveyed understanding of plan. Medication Side Effects and Warnings were discussed with patient      Josué Villatoro is being evaluated by a Virtual Visit (video visit) encounter to address concerns as mentioned above. A caregiver was present when appropriate. Due to this being a TeleHealth encounter (During CDSX-03 public health emergency), evaluation of the following organ systems was limited: Vitals/Constitutional/EENT/Resp/CV/GI//MS/Neuro/Skin/Heme-Lymph-Imm. Pursuant to the emergency declaration under the [address] [place] [address], [address] [place] [address] authority and the [employer] and [employer] Act, this Virtual Visit was conducted with patient's (and/or legal guardian's) consent, to reduce the patient's risk of exposure to COVID-19 and provide necessary medical care. The patient (and/or legal guardian) has also been advised to contact this office for worsening conditions or problems, and seek emergency medical treatment and/or call 911 if deemed necessary. Patient identification was verified at the start of the visit: Yes    Services were provided through a video synchronous discussion virtually to substitute for in-person clinic visit. Patient and provider were located at their individual homes.   --Linda Nielson DO on 6/14/2022 at 9:29 AM

## 2022-08-27 LAB
CORTIS AM PEAK SERPL-MCNC: 28.8 UG/DL (ref 6.2–19.4)
IRON SERPL-MCNC: 73 UG/DL (ref 38–169)
PROLACTIN SERPL-MCNC: 11.9 NG/ML (ref 4–15.2)
TRANSFERRIN SERPL-MCNC: 283 MG/DL (ref 177–329)

## 2022-08-30 ENCOUNTER — VIRTUAL VISIT (OUTPATIENT)
Dept: FAMILY MEDICINE CLINIC | Age: 42
End: 2022-08-30
Payer: COMMERCIAL

## 2022-08-30 DIAGNOSIS — F31.10 BIPOLAR DISORDER, MOST RECENT EPISODE MANIC (HCC): ICD-10-CM

## 2022-08-30 DIAGNOSIS — J30.1 SEASONAL ALLERGIC RHINITIS DUE TO POLLEN: ICD-10-CM

## 2022-08-30 DIAGNOSIS — E29.1 HYPOGONADISM IN MALE: Primary | ICD-10-CM

## 2022-08-30 DIAGNOSIS — Z51.81 MEDICATION MONITORING ENCOUNTER: ICD-10-CM

## 2022-08-30 PROCEDURE — 99214 OFFICE O/P EST MOD 30 MIN: CPT | Performed by: FAMILY MEDICINE

## 2022-08-30 RX ORDER — CLOMIPHENE CITRATE 50 MG/1
25 TABLET ORAL DAILY
Qty: 45 TABLET | Refills: 1 | Status: SHIPPED | OUTPATIENT
Start: 2022-08-30

## 2022-08-30 RX ORDER — CETIRIZINE HCL 10 MG
10 TABLET ORAL DAILY
Qty: 90 TABLET | Refills: 3 | Status: SHIPPED | OUTPATIENT
Start: 2022-08-30

## 2022-08-30 RX ORDER — LAMOTRIGINE 25 MG/1
50 TABLET ORAL DAILY
Qty: 180 TABLET | Refills: 0 | Status: SHIPPED | OUTPATIENT
Start: 2022-08-30

## 2022-08-30 NOTE — PROGRESS NOTES
Progress Note    he is a 36y.o. year old male who presents for evalution. Subjective:     Patient here for follow-up on Clomid taking 25 mg daily. He states that he is feeling better overall on this for hypergonadism. Not currently have any side effects from it. Labs were drawn that I had not ordered these were printed from somewhere I am not sure by the wife and were done at [place] [address]. Advised to please not do this again as these labs are drawn a year ago for part of his work-up for hypogonadism. Patient with bipolar disorder we discontinued the Wellbutrin he has noticed no noticeable difference off the Wellbutrin. He is interested in increasing his Lamictal he is on 2 mg once daily currently. Feels like he can gain better benefit from this and feels like the Lamictal is the only thing that is helped with his mood disorder. He would like a prescription for Zyrtec sent to the pharmacy. Reviewed PmHx, RxHx, FmHx, SocHx, AllgHx and updated and dated in the chart. Review of Systems - negative except as listed above in the HPI    Objective: There were no vitals filed for this visit. Current Outpatient Medications   Medication Sig    lamoTRIgine (LaMICtal) 25 mg tablet Take 2 Tablets by mouth daily. With 200mg dose    cetirizine (ZYRTEC) 10 mg tablet Take 1 Tablet by mouth daily. clomiPHENE (CLOMID) 50 mg tablet Take 0.5 Tablets by mouth daily. lamoTRIgine (LaMICtal) 200 mg tablet Take 1 Tablet by mouth daily. fluticasone propionate (FLONASE) 50 mcg/actuation nasal spray 2 Sprays by Both Nostrils route daily for 90 days. Long term, three month supply  Indications: inflammation of the nose due to an allergy (Patient not taking: Reported on 4/28/2022)     No current facility-administered medications for this visit.        Physical Examination: General appearance - alert, well appearing, and in no distress  Mental status - alert, oriented to person, place, and time      Assessment/ Plan:   Diagnoses and all orders for this visit:    1. Hypogonadism in male  -     TESTOSTERONE, FREE & TOTAL; Future  -     CBC W/O DIFF; Future  -     clomiPHENE (CLOMID) 50 mg tablet; Take 0.5 Tablets by mouth daily. 2. Medication monitoring encounter  -     TESTOSTERONE, FREE & TOTAL; Future  -     CBC W/O DIFF; Future    3. Bipolar disorder, most recent episode manic (HCC)  -     lamoTRIgine (LaMICtal) 25 mg tablet; Take 2 Tablets by mouth daily. With 200mg dose    4. Seasonal allergic rhinitis due to pollen  -     cetirizine (ZYRTEC) 10 mg tablet; Take 1 Tablet by mouth daily. Follow-up and Dispositions    Return in about 3 months (around 11/30/2022), or if symptoms worsen or fail to improve. I have discussed the diagnosis with the patient and the intended plan as seen in the above orders. The patient has received an after-visit summary and questions were answered concerning future plans. Pt conveyed understanding of plan. Medication Side Effects and Warnings were discussed with patient      Lou Padron is being evaluated by a Virtual Visit (video visit) encounter to address concerns as mentioned above. A caregiver was present when appropriate. Due to this being a TeleHealth encounter (During KEGNU-64 public health emergency), evaluation of the following organ systems was limited: Vitals/Constitutional/EENT/Resp/CV/GI//MS/Neuro/Skin/Heme-Lymph-Imm. Pursuant to the emergency declaration under the [facility], [address] [place] [address] authority and the [employer] Act, this Virtual Visit was conducted with patient's (and/or legal guardian's) consent, to reduce the patient's risk of exposure to COVID-19 and provide necessary medical care.   The patient (and/or legal guardian) has also been advised to contact this office for worsening conditions or problems, and seek emergency medical treatment and/or call 911 if deemed necessary. Patient identification was verified at the start of the visit: Yes    Services were provided through a video synchronous discussion virtually to substitute for in-person clinic visit. Patient and provider were located at their individual homes.   --Chante Wu DO on 8/30/2022 at 11:08 AM

## 2022-08-30 NOTE — PATIENT INSTRUCTIONS
A Healthy Lifestyle: Care Instructions  Your Care Instructions     A healthy lifestyle can help you feel good, stay at a healthy weight, and have plenty of energy for both work and play. A healthy lifestyle is something you can share with your whole family. A healthy lifestyle also can lower your risk for serious health problems, such as high blood pressure, heart disease, and diabetes. You can follow a few steps listed below to improve your health and the health of your family. Follow-up care is a key part of your treatment and safety. Be sure to make and go to all appointments, and call your doctor if you are having problems. It's also a good idea to know your test results and keep a list of the medicines you take. How can you care for yourself at home? Do not eat too much sugar, fat, or fast foods. You can still have dessert and treats now and then. The goal is moderation. Start small to improve your eating habits. Pay attention to portion sizes, drink less juice and soda pop, and eat more fruits and vegetables. Eat a healthy amount of food. A 3-ounce serving of meat, for example, is about the size of a deck of cards. Fill the rest of your plate with vegetables and whole grains. Limit the amount of soda and sports drinks you have every day. Drink more water when you are thirsty. Eat plenty of fruits and vegetables every day. Have an apple or some carrot sticks as an afternoon snack instead of a candy bar. Try to have fruits and/or vegetables at every meal.  Make exercise part of your daily routine. You may want to start with simple activities, such as walking, bicycling, or slow swimming. Try to be active 30 to 60 minutes every day. You do not need to do all 30 to 60 minutes all at once. For example, you can exercise 3 times a day for 10 or 20 minutes. Moderate exercise is safe for most people, but it is always a good idea to talk to your doctor before starting an exercise program.  Keep moving.  Maryjo Cox the lawn, work in the garden, or clean your house. Take the stairs instead of the elevator at work. If you smoke, quit. People who smoke have an increased risk for heart attack, stroke, cancer, and other lung illnesses. Quitting is hard, but there are ways to boost your chance of quitting tobacco for good. Use nicotine gum, patches, or lozenges. Ask your doctor about stop-smoking programs and medicines. Keep trying. In addition to reducing your risk of diseases in the future, you will notice some benefits soon after you stop using tobacco. If you have shortness of breath or asthma symptoms, they will likely get better within a few weeks after you quit. Limit how much alcohol you drink. Moderate amounts of alcohol (up to 2 drinks a day for men, 1 drink a day for women) are okay. But drinking too much can lead to liver problems, high blood pressure, and other health problems. Family health  If you have a family, there are many things you can do together to improve your health. Eat meals together as a family as often as possible. Eat healthy foods. This includes fruits, vegetables, lean meats and dairy, and whole grains. Include your family in your fitness plan. Most people think of activities such as jogging or tennis as the way to fitness, but there are many ways you and your family can be more active. Anything that makes you breathe hard and gets your heart pumping is exercise. Here are some tips:  Walk to do errands or to take your child to school or the bus. Go for a family bike ride after dinner instead of watching TV. Where can you learn more? Go to http://www.gray.com/  Enter X760 in the search box to learn more about \"A Healthy Lifestyle: Care Instructions. \"  Current as of: June 16, 2021               Content Version: 13.2  © 6925-0065 Healthwise, Incorporated.    Care instructions adapted under license by [employer] (which disclaims liability or warranty for this information). If you have questions about a medical condition or this instruction, always ask your healthcare professional. [place] [address] any warranty or liability for your use of this information.

## 2022-08-30 NOTE — PROGRESS NOTES
Denise Hernandez couldn't find an explantation for why these were drawn. I asked patient while checking him in for VV. His wife states that at his last apt you mentioned that they needed to be redrawn in 2 month. They were previously drawn last year.  So she went to his mychart and printed the previous labs and took them to [facility] so they elvis them

## 2022-09-03 ENCOUNTER — TELEPHONE (OUTPATIENT)
Dept: FAMILY MEDICINE CLINIC | Age: 42
End: 2022-09-03

## 2022-09-03 DIAGNOSIS — F31.10 BIPOLAR DISORDER, MOST RECENT EPISODE MANIC (HCC): ICD-10-CM

## 2022-09-03 RX ORDER — LAMOTRIGINE 200 MG/1
200 TABLET ORAL DAILY
Qty: 30 TABLET | Refills: 0 | Status: SHIPPED | OUTPATIENT
Start: 2022-09-03 | End: 2022-09-06 | Stop reason: SDUPTHER

## 2022-09-03 NOTE — TELEPHONE ENCOUNTER
Returned call to answering service, spoke with wife, who states lamictal arrived from mail order pharmacy today, dose increased to 250 mg daily at appt with Dr. Waqar Singh this week but only 25 mg tablets arrived, no refill arrived for 200 mg tablet. Will send 30 day supply of lamotrigine 200 mg to local pharmacy (they requested jhonatan at [facility]), they will call when office opens on Tuesday for long term refill.

## 2022-09-06 DIAGNOSIS — F31.10 BIPOLAR DISORDER, MOST RECENT EPISODE MANIC (HCC): ICD-10-CM

## 2022-09-06 RX ORDER — LAMOTRIGINE 200 MG/1
200 TABLET ORAL DAILY
Qty: 30 TABLET | Refills: 0 | Status: SHIPPED | OUTPATIENT
Start: 2022-09-06 | End: 2022-09-27 | Stop reason: SDUPTHER

## 2022-09-06 RX ORDER — LAMOTRIGINE 200 MG/1
TABLET ORAL
Qty: 30 TABLET | Refills: 1 | OUTPATIENT
Start: 2022-09-06

## 2022-09-26 ENCOUNTER — TELEPHONE (OUTPATIENT)
Dept: FAMILY MEDICINE CLINIC | Age: 42
End: 2022-09-26

## 2022-09-27 DIAGNOSIS — F31.10 BIPOLAR DISORDER, MOST RECENT EPISODE MANIC (HCC): ICD-10-CM

## 2022-09-27 RX ORDER — LAMOTRIGINE 200 MG/1
200 TABLET ORAL DAILY
Qty: 90 TABLET | Refills: 3 | Status: SHIPPED | OUTPATIENT
Start: 2022-09-27

## 2022-09-30 LAB
ERYTHROCYTE [DISTWIDTH] IN BLOOD BY AUTOMATED COUNT: 13.8 % (ref 11.6–15.4)
HCT VFR BLD AUTO: 46.9 % (ref 37.5–51)
HGB BLD-MCNC: 15.1 G/DL (ref 13–17.7)
MCH RBC QN AUTO: 26.9 PG (ref 26.6–33)
MCHC RBC AUTO-ENTMCNC: 32.2 G/DL (ref 31.5–35.7)
MCV RBC AUTO: 84 FL (ref 79–97)
PLATELET # BLD AUTO: 275 X10E3/UL (ref 150–450)
RBC # BLD AUTO: 5.62 X10E6/UL (ref 4.14–5.8)
TESTOST FREE SERPL-MCNC: 16 PG/ML (ref 6.8–21.5)
TESTOST SERPL-MCNC: 614 NG/DL (ref 264–916)
WBC # BLD AUTO: 7.5 X10E3/UL (ref 3.4–10.8)

## 2022-11-28 DIAGNOSIS — F31.10 BIPOLAR DISORDER, MOST RECENT EPISODE MANIC (HCC): ICD-10-CM

## 2022-11-28 DIAGNOSIS — J30.1 SEASONAL ALLERGIC RHINITIS DUE TO POLLEN: ICD-10-CM

## 2022-11-28 RX ORDER — LAMOTRIGINE 25 MG/1
50 TABLET ORAL DAILY
Qty: 180 TABLET | Refills: 0 | Status: CANCELLED | OUTPATIENT
Start: 2022-11-28

## 2022-11-29 ENCOUNTER — VIRTUAL VISIT (OUTPATIENT)
Dept: FAMILY MEDICINE CLINIC | Age: 42
End: 2022-11-29
Payer: COMMERCIAL

## 2022-11-29 DIAGNOSIS — F31.10 BIPOLAR DISORDER, MOST RECENT EPISODE MANIC (HCC): ICD-10-CM

## 2022-11-29 DIAGNOSIS — E29.1 HYPOGONADISM IN MALE: ICD-10-CM

## 2022-11-29 DIAGNOSIS — F41.9 ANXIETY: Primary | ICD-10-CM

## 2022-11-29 PROCEDURE — 99214 OFFICE O/P EST MOD 30 MIN: CPT | Performed by: FAMILY MEDICINE

## 2022-11-29 RX ORDER — LAMOTRIGINE 25 MG/1
50 TABLET ORAL DAILY
Qty: 180 TABLET | Refills: 3 | Status: SHIPPED | OUTPATIENT
Start: 2022-11-29

## 2022-11-29 RX ORDER — CLOMIPHENE CITRATE 50 MG/1
25 TABLET ORAL DAILY
Qty: 45 TABLET | Refills: 1 | Status: SHIPPED | OUTPATIENT
Start: 2022-11-29

## 2022-11-29 RX ORDER — HYDROXYZINE 50 MG/1
50 TABLET, FILM COATED ORAL
Qty: 90 TABLET | Refills: 3 | Status: SHIPPED | OUTPATIENT
Start: 2022-11-29

## 2022-11-29 RX ORDER — CETIRIZINE HYDROCHLORIDE 10 MG/1
10 TABLET ORAL DAILY
Qty: 90 TABLET | Refills: 3 | Status: SHIPPED | OUTPATIENT
Start: 2022-11-29

## 2022-11-29 NOTE — PROGRESS NOTES
Progress Note    he is a 36y.o. year old male who presents for evalution. Subjective:     Patient here for refill of Lamictal taking 250 mg total daily needs refill on the 25. Bipolar has been relatively stable. Does having some anxiety has been using Atarax 50 mg as needed sometimes 1-2 times a day. He would like a refill on this. Does work well when needed does not necessarily take every day though. Patient taking Clomid for hypogonadism started this due to possibly attempting pregnancy with his wife. This has been working well for him. His labs are up-to-date on this. Testosterone blood counts were checked in September without issue. Reviewed PmHx, RxHx, FmHx, SocHx, AllgHx and updated and dated in the chart. Review of Systems - negative except as listed above in the HPI    Objective: There were no vitals filed for this visit. Current Outpatient Medications   Medication Sig    lamoTRIgine (LaMICtal) 25 mg tablet Take 2 Tablets by mouth daily. With 200mg dose    hydrOXYzine HCL (ATARAX) 50 mg tablet Take 1 Tablet by mouth three (3) times daily as needed for Anxiety. clomiPHENE (CLOMID) 50 mg tablet Take 0.5 Tablets by mouth daily. lamoTRIgine (LaMICtal) 200 mg tablet Take 1 Tablet by mouth daily. Take with 2 of the 25 mg tablets for total daily dose of 250 mg    cetirizine (ZYRTEC) 10 mg tablet Take 1 Tablet by mouth daily. fluticasone propionate (FLONASE) 50 mcg/actuation nasal spray 2 Sprays by Both Nostrils route daily for 90 days. Long term, three month supply  Indications: inflammation of the nose due to an allergy (Patient not taking: Reported on 4/28/2022)     No current facility-administered medications for this visit. Physical Examination: General appearance - alert, well appearing, and in no distress  Mental status - alert, oriented to person, place, and time      Assessment/ Plan:   Diagnoses and all orders for this visit:    1.  Anxiety  - hydrOXYzine HCL (ATARAX) 50 mg tablet; Take 1 Tablet by mouth three (3) times daily as needed for Anxiety. 2. Bipolar disorder, most recent episode manic (HCC)  -     lamoTRIgine (LaMICtal) 25 mg tablet; Take 2 Tablets by mouth daily. With 200mg dose    3. Hypogonadism in male  -     clomiPHENE (CLOMID) 50 mg tablet; Take 0.5 Tablets by mouth daily. Follow-up and Dispositions    Return in about 3 months (around 2/28/2023), or if symptoms worsen or fail to improve. I have discussed the diagnosis with the patient and the intended plan as seen in the above orders. The patient has received an after-visit summary and questions were answered concerning future plans. Pt conveyed understanding of plan. Medication Side Effects and Warnings were discussed with patient      Ana Ya is being evaluated by a Virtual Visit (video visit) encounter to address concerns as mentioned above. A caregiver was present when appropriate. Due to this being a TeleHealth encounter (During [facility] emergency), evaluation of the following organ systems was limited: Vitals/Constitutional/EENT/Resp/CV/GI//MS/Neuro/Skin/Heme-Lymph-Imm. Pursuant to the emergency declaration under the [address] [place] [address] authority and the [employer] Act, this Virtual Visit was conducted with patient's (and/or legal guardian's) consent, to reduce the patient's risk of exposure to COVID-19 and provide necessary medical care. The patient (and/or legal guardian) has also been advised to contact this office for worsening conditions or problems, and seek emergency medical treatment and/or call 911 if deemed necessary. Patient identification was verified at the start of the visit: Yes    Services were provided through a video synchronous discussion virtually to substitute for in-person clinic visit.  Patient and provider were located at their individual homes.   --McLaren Northern Michiganalejandra Low, DO on 11/29/2022 at 8:46 AM

## 2022-11-29 NOTE — PATIENT INSTRUCTIONS

## 2023-03-08 NOTE — PROGRESS NOTES
Telephonic outreach to patient and his wife Stacy. Stacy answered the call and informed this ECM that the patient was actually in the middle of a virtual call with his [facility] therapist. Omero Chowdhury requested that this ECM reach out tomorrow to follow up on his progress.
100

## 2023-05-22 RX ORDER — FLUTICASONE PROPIONATE 50 MCG
2 SPRAY, SUSPENSION (ML) NASAL DAILY
COMMUNITY
Start: 2022-03-24

## 2023-05-22 RX ORDER — HYDROXYZINE 50 MG/1
50 TABLET, FILM COATED ORAL 3 TIMES DAILY PRN
COMMUNITY
Start: 2022-11-29

## 2023-05-22 RX ORDER — LAMOTRIGINE 25 MG/1
50 TABLET ORAL DAILY
COMMUNITY
Start: 2022-11-29 | End: 2023-06-06 | Stop reason: SDUPTHER

## 2023-05-22 RX ORDER — LAMOTRIGINE 200 MG/1
200 TABLET ORAL DAILY
COMMUNITY
Start: 2022-09-27 | End: 2023-06-06 | Stop reason: SDUPTHER

## 2023-05-22 RX ORDER — CETIRIZINE HYDROCHLORIDE 10 MG/1
10 TABLET ORAL DAILY
COMMUNITY
Start: 2022-11-29

## 2023-06-06 ENCOUNTER — TELEMEDICINE (OUTPATIENT)
Age: 43
End: 2023-06-06
Payer: COMMERCIAL

## 2023-06-06 DIAGNOSIS — F31.10 BIPOLAR DISORDER, CURRENT EPISODE MANIC WITHOUT PSYCHOTIC FEATURES, UNSPECIFIED (HCC): ICD-10-CM

## 2023-06-06 PROCEDURE — 99213 OFFICE O/P EST LOW 20 MIN: CPT | Performed by: FAMILY MEDICINE

## 2023-06-06 RX ORDER — LAMOTRIGINE 200 MG/1
200 TABLET ORAL DAILY
Qty: 90 TABLET | Refills: 1 | Status: SHIPPED | OUTPATIENT
Start: 2023-06-06 | End: 2023-06-06 | Stop reason: SDUPTHER

## 2023-06-06 RX ORDER — LAMOTRIGINE 25 MG/1
50 TABLET ORAL DAILY
Qty: 180 TABLET | Refills: 1 | Status: SHIPPED | OUTPATIENT
Start: 2023-06-06 | End: 2023-06-06 | Stop reason: SDUPTHER

## 2023-06-06 RX ORDER — LAMOTRIGINE 25 MG/1
50 TABLET ORAL DAILY
Qty: 180 TABLET | Refills: 2 | Status: SHIPPED | OUTPATIENT
Start: 2023-06-06

## 2023-06-06 RX ORDER — LAMOTRIGINE 200 MG/1
200 TABLET ORAL DAILY
Qty: 90 TABLET | Refills: 2 | Status: SHIPPED | OUTPATIENT
Start: 2023-06-06

## 2023-06-06 SDOH — ECONOMIC STABILITY: FOOD INSECURITY: WITHIN THE PAST 12 MONTHS, YOU WORRIED THAT YOUR FOOD WOULD RUN OUT BEFORE YOU GOT MONEY TO BUY MORE.: NEVER TRUE

## 2023-06-06 SDOH — ECONOMIC STABILITY: HOUSING INSECURITY
IN THE LAST 12 MONTHS, WAS THERE A TIME WHEN YOU DID NOT HAVE A STEADY PLACE TO SLEEP OR SLEPT IN A SHELTER (INCLUDING NOW)?: NO

## 2023-06-06 SDOH — ECONOMIC STABILITY: INCOME INSECURITY: HOW HARD IS IT FOR YOU TO PAY FOR THE VERY BASICS LIKE FOOD, HOUSING, MEDICAL CARE, AND HEATING?: NOT HARD AT ALL

## 2023-06-06 SDOH — ECONOMIC STABILITY: FOOD INSECURITY: WITHIN THE PAST 12 MONTHS, THE FOOD YOU BOUGHT JUST DIDN'T LAST AND YOU DIDN'T HAVE MONEY TO GET MORE.: NEVER TRUE

## 2023-06-06 ASSESSMENT — PATIENT HEALTH QUESTIONNAIRE - PHQ9
10. IF YOU CHECKED OFF ANY PROBLEMS, HOW DIFFICULT HAVE THESE PROBLEMS MADE IT FOR YOU TO DO YOUR WORK, TAKE CARE OF THINGS AT HOME, OR GET ALONG WITH OTHER PEOPLE: 0
4. FEELING TIRED OR HAVING LITTLE ENERGY: 0
3. TROUBLE FALLING OR STAYING ASLEEP: 0
1. LITTLE INTEREST OR PLEASURE IN DOING THINGS: 0
8. MOVING OR SPEAKING SO SLOWLY THAT OTHER PEOPLE COULD HAVE NOTICED. OR THE OPPOSITE, BEING SO FIGETY OR RESTLESS THAT YOU HAVE BEEN MOVING AROUND A LOT MORE THAN USUAL: 0
SUM OF ALL RESPONSES TO PHQ QUESTIONS 1-9: 0
5. POOR APPETITE OR OVEREATING: 0
SUM OF ALL RESPONSES TO PHQ QUESTIONS 1-9: 0
SUM OF ALL RESPONSES TO PHQ9 QUESTIONS 1 & 2: 0
9. THOUGHTS THAT YOU WOULD BE BETTER OFF DEAD, OR OF HURTING YOURSELF: 0
6. FEELING BAD ABOUT YOURSELF - OR THAT YOU ARE A FAILURE OR HAVE LET YOURSELF OR YOUR FAMILY DOWN: 0
SUM OF ALL RESPONSES TO PHQ QUESTIONS 1-9: 0
2. FEELING DOWN, DEPRESSED OR HOPELESS: 0
SUM OF ALL RESPONSES TO PHQ QUESTIONS 1-9: 0
7. TROUBLE CONCENTRATING ON THINGS, SUCH AS READING THE NEWSPAPER OR WATCHING TELEVISION: 0

## 2023-06-06 NOTE — PROGRESS NOTES
Progress Note    he is a 39y.o. year old male who presents for evaluation. Subjective:     Patient here for follow-up states he stopped the Clomid because he was no longer able to get the generic and the price went up significantly. He has been feeling fine off of it. Patient with bipolar disorder with depression is interested in ketamine infusions which she discussed there is a local clinic called [facility] however this is not covered by insurance usually for severe depression with suicidal ideations. Advised she can certainly call them up and ask what the cost is although from my understanding it is usually multithousand dollars per infusion. He does feel relatively stable on the Lamictal he just does not like having to take pills every day. Reviewed PmHx, RxHx, FmHx, SocHx, AllgHx and updated and dated in the chart. Review of Systems - negative except as listed above in the HPI    Objective: There were no vitals filed for this visit. Current Outpatient Medications   Medication Sig    lamoTRIgine (LAMICTAL) 200 MG tablet Take 1 tablet by mouth daily Total dose of 250mg daily    lamoTRIgine (LAMICTAL) 25 MG tablet Take 2 tablets by mouth daily    cetirizine (ZYRTEC) 10 MG tablet Take 1 tablet by mouth daily    fluticasone (FLONASE) 50 MCG/ACT nasal spray 2 sprays by Nasal route daily    hydrOXYzine HCl (ATARAX) 50 MG tablet Take 1 tablet by mouth 3 times daily as needed (Patient not taking: Reported on 6/6/2023)     No current facility-administered medications for this visit. Physical Examination: General appearance - alert, well appearing, and in no distress  Mental status - alert, oriented to person, place, and time      Assessment/ Plan:   Lexii Sotelo was seen today for follow-up. Diagnoses and all orders for this visit:    Bipolar disorder, current episode manic without psychotic features, unspecified ([facility] [address]  -     Discontinue: lamoTRIgine (LAMICTAL) 200 MG tablet;  Take 1 tablet by

## 2024-02-08 ENCOUNTER — OFFICE VISIT (OUTPATIENT)
Age: 44
End: 2024-02-08
Payer: COMMERCIAL

## 2024-02-08 VITALS
BODY MASS INDEX: 28.04 KG/M2 | HEART RATE: 65 BPM | HEIGHT: 68 IN | SYSTOLIC BLOOD PRESSURE: 104 MMHG | OXYGEN SATURATION: 99 % | RESPIRATION RATE: 18 BRPM | TEMPERATURE: 97.9 F | DIASTOLIC BLOOD PRESSURE: 73 MMHG | WEIGHT: 185 LBS

## 2024-02-08 DIAGNOSIS — F41.9 ANXIETY: Primary | ICD-10-CM

## 2024-02-08 DIAGNOSIS — F31.10 BIPOLAR DISORDER, CURRENT EPISODE MANIC WITHOUT PSYCHOTIC FEATURES, UNSPECIFIED (HCC): ICD-10-CM

## 2024-02-08 PROCEDURE — 99213 OFFICE O/P EST LOW 20 MIN: CPT | Performed by: FAMILY MEDICINE

## 2024-02-08 RX ORDER — LAMOTRIGINE 25 MG/1
50 TABLET ORAL DAILY
Qty: 180 TABLET | Refills: 3 | Status: SHIPPED | OUTPATIENT
Start: 2024-02-08

## 2024-02-08 RX ORDER — HYDROXYZINE 50 MG/1
50 TABLET, FILM COATED ORAL 3 TIMES DAILY PRN
Qty: 180 TABLET | Refills: 3 | Status: SHIPPED | OUTPATIENT
Start: 2024-02-08

## 2024-02-08 RX ORDER — LAMOTRIGINE 200 MG/1
200 TABLET ORAL DAILY
Qty: 90 TABLET | Refills: 3 | Status: SHIPPED | OUTPATIENT
Start: 2024-02-08

## 2024-02-08 ASSESSMENT — PATIENT HEALTH QUESTIONNAIRE - PHQ9
SUM OF ALL RESPONSES TO PHQ QUESTIONS 1-9: 4
SUM OF ALL RESPONSES TO PHQ9 QUESTIONS 1 & 2: 2
3. TROUBLE FALLING OR STAYING ASLEEP: 0
SUM OF ALL RESPONSES TO PHQ QUESTIONS 1-9: 4
2. FEELING DOWN, DEPRESSED OR HOPELESS: 1
6. FEELING BAD ABOUT YOURSELF - OR THAT YOU ARE A FAILURE OR HAVE LET YOURSELF OR YOUR FAMILY DOWN: 0
5. POOR APPETITE OR OVEREATING: 0
8. MOVING OR SPEAKING SO SLOWLY THAT OTHER PEOPLE COULD HAVE NOTICED. OR THE OPPOSITE, BEING SO FIGETY OR RESTLESS THAT YOU HAVE BEEN MOVING AROUND A LOT MORE THAN USUAL: 0
7. TROUBLE CONCENTRATING ON THINGS, SUCH AS READING THE NEWSPAPER OR WATCHING TELEVISION: 1
10. IF YOU CHECKED OFF ANY PROBLEMS, HOW DIFFICULT HAVE THESE PROBLEMS MADE IT FOR YOU TO DO YOUR WORK, TAKE CARE OF THINGS AT HOME, OR GET ALONG WITH OTHER PEOPLE: 1
9. THOUGHTS THAT YOU WOULD BE BETTER OFF DEAD, OR OF HURTING YOURSELF: 0
1. LITTLE INTEREST OR PLEASURE IN DOING THINGS: 1
SUM OF ALL RESPONSES TO PHQ QUESTIONS 1-9: 4
4. FEELING TIRED OR HAVING LITTLE ENERGY: 1
SUM OF ALL RESPONSES TO PHQ QUESTIONS 1-9: 4

## 2024-02-08 NOTE — PROGRESS NOTES
Chief Complaint   Patient presents with    Medication Refill       1.Pt states all is going well, just needs refill on his medications.       1. Have you been to the ER, urgent care clinic since your last visit?  Hospitalized since your last visit?No    2. Have you seen or consulted any other health care providers outside of the [facility] since your last visit?  Include any pap smears or colon screening. No    
8/8/2024), or if symptoms worsen or fail to improve.        I have discussed the diagnosis with the patient and the intended plan as seen in the above orders.  The patient has received an after-visit summary and questions were answered concerning future plans. Pt conveyed understanding of plan.    Medication Side Effects and Warnings were discussed with patient    An electronic signature was used to authenticate this note  Claudette Chavez, DO

## 2024-02-08 NOTE — PATIENT INSTRUCTIONS

## 2024-08-06 ENCOUNTER — TELEPHONE (OUTPATIENT)
Age: 44
End: 2024-08-06

## 2024-08-06 NOTE — TELEPHONE ENCOUNTER
----- Message from Brie Cortez Ymbong sent at 8/6/2024 11:48 AM EDT -----  Regarding: [facility] Appointment Request  [facility] Appointment Request    Patient needs appointment for [facility] Appointment Type: New to Provider.    Patient Requested Dates(s): August 7,8,or 9 2024 or the week after that  Patient Requested Time:Anytime available  Provider Name: KRISTA Pack or any other provider available.    Reason for Appointment Request: Other Patient wants to switch providers and for adjustments with patient's medication.  --------------------------------------------------------------------------------------------------------------------------    Relationship to Patient: Spouse/Partner / wife     Call Back Information: OK to leave message on voicemail  Preferred Call Back Number: Phone [phone]

## 2024-08-09 ENCOUNTER — OFFICE VISIT (OUTPATIENT)
Age: 44
End: 2024-08-09
Payer: COMMERCIAL

## 2024-08-09 VITALS
BODY MASS INDEX: 28.07 KG/M2 | HEIGHT: 68 IN | WEIGHT: 185.2 LBS | SYSTOLIC BLOOD PRESSURE: 103 MMHG | DIASTOLIC BLOOD PRESSURE: 71 MMHG | HEART RATE: 64 BPM | TEMPERATURE: 98.3 F | OXYGEN SATURATION: 98 %

## 2024-08-09 DIAGNOSIS — F31.77 BIPOLAR DISORDER, IN PARTIAL REMISSION, MOST RECENT EPISODE MIXED (HCC): Primary | ICD-10-CM

## 2024-08-09 PROCEDURE — 99214 OFFICE O/P EST MOD 30 MIN: CPT | Performed by: NURSE PRACTITIONER

## 2024-08-09 RX ORDER — LAMOTRIGINE 100 MG/1
100 TABLET ORAL DAILY
Qty: 90 TABLET | Refills: 1 | Status: SHIPPED | OUTPATIENT
Start: 2024-08-09

## 2024-08-09 NOTE — PROGRESS NOTES
Progress Note        Here to discuss medications  Taking 250 mg of lamictal and atarax prn  Pt thinks he may need a higher dose of lamictal  Feeling more agitated, irritable and it's easier for him to get agitated  Part of this is due to their current living situation - living with extended family currently  No tyler, no hyperactivity, no racing thoughts, no excessive spending,   Having some mild to moderate depression (this is his baseline per pt)  Cycling pattern is stable with his mood  He states that it will be about six months before they can live on their own    Depression       Subjective:     Patient's medications, allergies, past medical, surgical, social and family histories were reviewed and updated as appropriate.    Review of Systems   Psychiatric/Behavioral:  Positive for depression.    All other systems reviewed and are negative.       Objective:     Vitals:    08/09/24 1321   BP: 103/71   Pulse: 64   Temp: 98.3 °F (36.8 °C)   SpO2: 98%   Weight: 84 kg (185 lb 3.2 oz)   Height: 1.727 m (5' 8\")       Physical Exam  Constitutional:       General: He is not in acute distress.     Appearance: Normal appearance. He is normal weight. He is not ill-appearing.   HENT:      Head: Normocephalic and atraumatic.      Nose: Nose normal.   Eyes:      Conjunctiva/sclera: Conjunctivae normal.   Cardiovascular:      Rate and Rhythm: Normal rate and regular rhythm.      Pulses: Normal pulses.      Heart sounds: Normal heart sounds.   Pulmonary:      Effort: Pulmonary effort is normal.      Breath sounds: Normal breath sounds.   Musculoskeletal:         General: Normal range of motion.      Cervical back: Normal range of motion and neck supple.   Skin:     General: Skin is warm and dry.   Neurological:      General: No focal deficit present.      Mental Status: He is alert and oriented to person, place, and time.   Psychiatric:         Mood and Affect: Mood normal.         Behavior: Behavior normal. 
Exposure: Yes   Alcohol Use: Not on file   Financial Resource Strain: Low Risk  (6/6/2023)    Overall Financial Resource Strain (CARDIA)     Difficulty of Paying Living Expenses: Not hard at all   Food Insecurity: Not on file (6/6/2023)   Transportation Needs: Unknown (6/6/2023)    PRAPARE - Transportation     Lack of Transportation (Medical): Not on file     Lack of Transportation (Non-Medical): No   Physical Activity: Not on file   Stress: Not on file   Social Connections: Not on file   Intimate Partner Violence: Not on file   Depression: At risk (2/8/2024)    PHQ-2     PHQ-2 Score: 4   Housing Stability: Unknown (6/6/2023)    Housing Stability Vital Sign     Unable to Pay for Housing in the Last Year: Not on file     Number of Places Lived in the Last Year: Not on file     Unstable Housing in the Last Year: No   Interpersonal Safety: Not on file   Utilities: Not on file       Click Here for Release of Records Request

## 2024-09-23 ENCOUNTER — OFFICE VISIT (OUTPATIENT)
Age: 44
End: 2024-09-23
Payer: COMMERCIAL

## 2024-09-23 VITALS
HEIGHT: 68 IN | HEART RATE: 56 BPM | TEMPERATURE: 97.4 F | DIASTOLIC BLOOD PRESSURE: 74 MMHG | BODY MASS INDEX: 28.13 KG/M2 | WEIGHT: 185.6 LBS | OXYGEN SATURATION: 98 % | SYSTOLIC BLOOD PRESSURE: 110 MMHG

## 2024-09-23 DIAGNOSIS — Z00.00 ENCOUNTER FOR WELL ADULT EXAM WITHOUT ABNORMAL FINDINGS: Primary | ICD-10-CM

## 2024-09-23 DIAGNOSIS — F31.70 BIPOLAR AFFECTIVE DISORDER IN REMISSION (HCC): ICD-10-CM

## 2024-09-23 PROCEDURE — 99396 PREV VISIT EST AGE 40-64: CPT | Performed by: NURSE PRACTITIONER

## 2024-09-23 NOTE — PROGRESS NOTES
Jones Mendez is a 44 y.o. male , id x 2(name and ). Reviewed record, history, and  medications.    This patient is accompanied in the office by his Wife Heidy.I have received verbal consent from Jones Mendez to discuss any/all medical information while they are present in the room.    Chief Complaint   Patient presents with    Annual Exam    Medication Check     F/up on lamictal increase, states that it is helping   Has however had discoloration in his finger nails since the increase.        Vitals:    24 1412   BP: 110/74   Site: Left Upper Arm   Position: Sitting   Cuff Size: Large Adult   Pulse: 56   Temp: 97.4 °F (36.3 °C)   SpO2: 98%   Weight: 84.2 kg (185 lb 9.6 oz)   Height: 1.727 m (5' 8\")       Non-Fasting    \"Have you been to the ER, urgent care clinic since your last visit?  Hospitalized since your last visit?\"    NO    “Have you seen or consulted any other health care providers outside of [facility] since your last visit?”    NO    “Have you had a colorectal cancer screening such as a colonoscopy/FIT/Cologuard?    NO    No colonoscopy on file  No cologuard on file  No FIT/FOBT on file   No flexible sigmoidoscopy on file         10/26/2021     7:43 AM   Amb Fall Risk Assessment and TUG Test   Total Score        Information is confidential and restricted. Go to Review Flowsheets to unlock data.         2024     9:40 AM   PHQ-9    Little interest or pleasure in doing things 1   Feeling down, depressed, or hopeless 1   Trouble falling or staying asleep, or sleeping too much 0   Feeling tired or having little energy 1   Poor appetite or overeating 0   Feeling bad about yourself - or that you are a failure or have let yourself or your family down 0   Trouble concentrating on things, such as reading the newspaper or watching television 1   Moving or speaking so slowly that other people could have noticed. Or the opposite - being so fidgety or restless that you have been moving 
plan.      Jessica hCu, APRN - CNP

## 2024-09-24 LAB
ALBUMIN SERPL-MCNC: 4 G/DL (ref 3.5–5)
ALBUMIN/GLOB SERPL: 1.3 (ref 1.1–2.2)
ALP SERPL-CCNC: 76 U/L (ref 45–117)
ALT SERPL-CCNC: 31 U/L (ref 12–78)
ANION GAP SERPL CALC-SCNC: 5 MMOL/L (ref 2–12)
AST SERPL-CCNC: 19 U/L (ref 15–37)
BASOPHILS # BLD: 0.1 K/UL (ref 0–0.1)
BASOPHILS NFR BLD: 1 % (ref 0–1)
BILIRUB SERPL-MCNC: 0.3 MG/DL (ref 0.2–1)
BUN SERPL-MCNC: 20 MG/DL (ref 6–20)
BUN/CREAT SERPL: 21 (ref 12–20)
CALCIUM SERPL-MCNC: 9 MG/DL (ref 8.5–10.1)
CHLORIDE SERPL-SCNC: 104 MMOL/L (ref 97–108)
CHOLEST SERPL-MCNC: 203 MG/DL
CO2 SERPL-SCNC: 28 MMOL/L (ref 21–32)
CREAT SERPL-MCNC: 0.95 MG/DL (ref 0.7–1.3)
DIFFERENTIAL METHOD BLD: ABNORMAL
EOSINOPHIL # BLD: 0.4 K/UL (ref 0–0.4)
EOSINOPHIL NFR BLD: 4 % (ref 0–7)
ERYTHROCYTE [DISTWIDTH] IN BLOOD BY AUTOMATED COUNT: 13.2 % (ref 11.5–14.5)
EST. AVERAGE GLUCOSE BLD GHB EST-MCNC: 100 MG/DL
GLOBULIN SER CALC-MCNC: 3.2 G/DL (ref 2–4)
GLUCOSE SERPL-MCNC: 87 MG/DL (ref 65–100)
HBA1C MFR BLD: 5.1 % (ref 4–5.6)
HCT VFR BLD AUTO: 42.5 % (ref 36.6–50.3)
HDLC SERPL-MCNC: 35 MG/DL
HDLC SERPL: 5.8 (ref 0–5)
HGB BLD-MCNC: 13.8 G/DL (ref 12.1–17)
IMM GRANULOCYTES # BLD AUTO: 0 K/UL (ref 0–0.04)
IMM GRANULOCYTES NFR BLD AUTO: 1 % (ref 0–0.5)
LDLC SERPL CALC-MCNC: 96.6 MG/DL (ref 0–100)
LYMPHOCYTES # BLD: 2.5 K/UL (ref 0.8–3.5)
LYMPHOCYTES NFR BLD: 28 % (ref 12–49)
MCH RBC QN AUTO: 27.3 PG (ref 26–34)
MCHC RBC AUTO-ENTMCNC: 32.5 G/DL (ref 30–36.5)
MCV RBC AUTO: 84 FL (ref 80–99)
MONOCYTES # BLD: 1 K/UL (ref 0–1)
MONOCYTES NFR BLD: 11 % (ref 5–13)
NEUTS SEG # BLD: 4.9 K/UL (ref 1.8–8)
NEUTS SEG NFR BLD: 55 % (ref 32–75)
NRBC # BLD: 0 K/UL (ref 0–0.01)
NRBC BLD-RTO: 0 PER 100 WBC
PLATELET # BLD AUTO: 310 K/UL (ref 150–400)
PMV BLD AUTO: 9.1 FL (ref 8.9–12.9)
POTASSIUM SERPL-SCNC: 4.6 MMOL/L (ref 3.5–5.1)
PROT SERPL-MCNC: 7.2 G/DL (ref 6.4–8.2)
RBC # BLD AUTO: 5.06 M/UL (ref 4.1–5.7)
SODIUM SERPL-SCNC: 137 MMOL/L (ref 136–145)
T4 FREE SERPL-MCNC: 0.9 NG/DL (ref 0.8–1.5)
TRIGL SERPL-MCNC: 357 MG/DL
TSH SERPL DL<=0.05 MIU/L-ACNC: 1.79 UIU/ML (ref 0.36–3.74)
VLDLC SERPL CALC-MCNC: 71.4 MG/DL
WBC # BLD AUTO: 8.7 K/UL (ref 4.1–11.1)

## 2024-12-02 DIAGNOSIS — F31.10 BIPOLAR DISORDER, CURRENT EPISODE MANIC WITHOUT PSYCHOTIC FEATURES, UNSPECIFIED (HCC): ICD-10-CM

## 2024-12-02 RX ORDER — LAMOTRIGINE 200 MG/1
200 TABLET ORAL DAILY
Qty: 90 TABLET | Refills: 3 | Status: SHIPPED | OUTPATIENT
Start: 2024-12-02

## 2024-12-09 ENCOUNTER — TELEPHONE (OUTPATIENT)
Facility: CLINIC | Age: 44
End: 2024-12-09

## 2024-12-10 NOTE — TELEPHONE ENCOUNTER
I called and clarified rx. Pt is taking 300 mg of lamictal daily.   
Pharmacy needs clarification prescription dosage for:    lamoTRIgine (LAMICTAL) 200 MG tablet  Please callback at [phone]  Option 1 Thank You   
done

## 2025-01-27 RX ORDER — LAMOTRIGINE 100 MG/1
100 TABLET ORAL DAILY
Qty: 90 TABLET | Refills: 0 | Status: SHIPPED | OUTPATIENT
Start: 2025-01-27

## 2025-02-22 NOTE — LETTER
NOTIFICATION RETURN TO WORK / SCHOOL 
 
1/29/2018 10:13 AM 
 
Mr. Percy Moses [address] [id number] To Whom It May Concern: Percy Moses is currently under the care of [address] [place] [address]. He will return to work/school on: 1/30/18 If there are questions or concerns please have the patient contact our office. Sincerely, Trena Ernandez MD 
 
                                
 
 Well appearing, awake, alert, oriented to person, place, time/situation and in no apparent distress. normal...

## 2025-03-25 ENCOUNTER — TELEPHONE (OUTPATIENT)
Facility: CLINIC | Age: 45
End: 2025-03-25

## 2025-03-25 RX ORDER — LAMOTRIGINE 150 MG/1
300 TABLET ORAL DAILY
Qty: 60 TABLET | Refills: 1 | Status: SHIPPED | OUTPATIENT
Start: 2025-03-25

## 2025-03-25 NOTE — TELEPHONE ENCOUNTER
Patient's wife, Heidy Mendez on PHI calling to request refill of lamoTRIgine (LAMICTAL) 200 MG tablet.     Stated patient has been taking 300 MG daily and has been taking (1) 200 MG tab along with (1) 100 MG tab.     Please call to clarify what dosage patient should be taking as the 200 MG is discontinued.      Heidy's CB# [phone]

## 2025-06-13 ENCOUNTER — TELEPHONE (OUTPATIENT)
Facility: CLINIC | Age: 45
End: 2025-06-13

## 2025-06-13 RX ORDER — LAMOTRIGINE 150 MG/1
300 TABLET ORAL DAILY
Qty: 60 TABLET | Refills: 1 | Status: SHIPPED | OUTPATIENT
Start: 2025-06-13

## 2025-07-23 ENCOUNTER — TELEPHONE (OUTPATIENT)
Facility: CLINIC | Age: 45
End: 2025-07-23

## 2025-07-23 RX ORDER — LAMOTRIGINE 150 MG/1
300 TABLET ORAL DAILY
Qty: 60 TABLET | Refills: 0 | Status: SHIPPED | OUTPATIENT
Start: 2025-07-23 | End: 2025-07-23 | Stop reason: SDUPTHER

## 2025-07-23 RX ORDER — LAMOTRIGINE 150 MG/1
300 TABLET ORAL DAILY
Qty: 60 TABLET | Refills: 0 | Status: SHIPPED | OUTPATIENT
Start: 2025-07-23

## 2025-07-23 NOTE — TELEPHONE ENCOUNTER
We received a fax refill request for Jones Mendez.  Please escribe lamoTRIgine (LAMICTAL) 150 MG tablet  to their pharmacy.  The pharmacy is correct in the chart and they are requesting a 60 day supply.

## 2025-08-08 RX ORDER — LAMOTRIGINE 150 MG/1
300 TABLET ORAL DAILY
Qty: 60 TABLET | Refills: 0 | Status: SHIPPED | OUTPATIENT
Start: 2025-08-08

## 2025-09-03 ENCOUNTER — TELEPHONE (OUTPATIENT)
Facility: CLINIC | Age: 45
End: 2025-09-03

## 2025-09-05 ENCOUNTER — OFFICE VISIT (OUTPATIENT)
Facility: CLINIC | Age: 45
End: 2025-09-05
Payer: COMMERCIAL

## 2025-09-05 VITALS
WEIGHT: 187.6 LBS | OXYGEN SATURATION: 99 % | DIASTOLIC BLOOD PRESSURE: 78 MMHG | SYSTOLIC BLOOD PRESSURE: 110 MMHG | BODY MASS INDEX: 28.43 KG/M2 | HEIGHT: 68 IN | TEMPERATURE: 98.1 F | HEART RATE: 66 BPM

## 2025-09-05 DIAGNOSIS — F31.77 BIPOLAR DISORDER, IN PARTIAL REMISSION, MOST RECENT EPISODE MIXED (HCC): ICD-10-CM

## 2025-09-05 DIAGNOSIS — R26.9 GAIT DISORDER: Primary | ICD-10-CM

## 2025-09-05 PROCEDURE — 99214 OFFICE O/P EST MOD 30 MIN: CPT | Performed by: NURSE PRACTITIONER

## 2025-09-05 RX ORDER — LAMOTRIGINE 150 MG/1
300 TABLET ORAL DAILY
Qty: 90 TABLET | Refills: 1 | Status: SHIPPED | OUTPATIENT
Start: 2025-09-05

## 2025-09-05 RX ORDER — LAMOTRIGINE 150 MG/1
300 TABLET ORAL DAILY
Qty: 180 TABLET | Status: CANCELLED | OUTPATIENT
Start: 2025-09-05